# Patient Record
Sex: FEMALE | Race: BLACK OR AFRICAN AMERICAN | URBAN - METROPOLITAN AREA
[De-identification: names, ages, dates, MRNs, and addresses within clinical notes are randomized per-mention and may not be internally consistent; named-entity substitution may affect disease eponyms.]

---

## 2022-03-18 PROBLEM — F98.8 ADD (ATTENTION DEFICIT DISORDER): Status: ACTIVE | Noted: 2019-03-07

## 2023-07-07 ENCOUNTER — OFFICE VISIT (OUTPATIENT)
Facility: CLINIC | Age: 38
End: 2023-07-07

## 2023-07-07 VITALS
RESPIRATION RATE: 14 BRPM | WEIGHT: 184.4 LBS | OXYGEN SATURATION: 98 % | HEIGHT: 65 IN | DIASTOLIC BLOOD PRESSURE: 67 MMHG | BODY MASS INDEX: 30.72 KG/M2 | HEART RATE: 93 BPM | TEMPERATURE: 98.4 F | SYSTOLIC BLOOD PRESSURE: 104 MMHG

## 2023-07-07 DIAGNOSIS — Z13.1 SCREENING FOR DIABETES MELLITUS (DM): ICD-10-CM

## 2023-07-07 DIAGNOSIS — K92.1 BLOOD CLOTS IN STOOL: ICD-10-CM

## 2023-07-07 DIAGNOSIS — F90.9 ATTENTION DEFICIT HYPERACTIVITY DISORDER (ADHD), UNSPECIFIED ADHD TYPE: ICD-10-CM

## 2023-07-07 DIAGNOSIS — G43.711 INTRACTABLE CHRONIC MIGRAINE WITHOUT AURA AND WITH STATUS MIGRAINOSUS: ICD-10-CM

## 2023-07-07 DIAGNOSIS — R04.0 FREQUENT EPISTAXIS: ICD-10-CM

## 2023-07-07 DIAGNOSIS — R79.89 POSITIVE D-DIMER: ICD-10-CM

## 2023-07-07 DIAGNOSIS — M94.0 COSTOCHONDRITIS: Primary | ICD-10-CM

## 2023-07-07 DIAGNOSIS — R55 SYNCOPE, UNSPECIFIED SYNCOPE TYPE: ICD-10-CM

## 2023-07-07 DIAGNOSIS — N83.209 CYST OF OVARY, UNSPECIFIED LATERALITY: ICD-10-CM

## 2023-07-07 DIAGNOSIS — Z13.89 SCREENING FOR HEMATURIA OR PROTEINURIA: ICD-10-CM

## 2023-07-07 DIAGNOSIS — R19.4 CHANGE IN BOWEL HABITS: ICD-10-CM

## 2023-07-07 DIAGNOSIS — E89.0 POSTPROCEDURAL HYPOTHYROIDISM: ICD-10-CM

## 2023-07-07 DIAGNOSIS — Z11.4 SCREENING FOR HUMAN IMMUNODEFICIENCY VIRUS: ICD-10-CM

## 2023-07-07 DIAGNOSIS — Z87.19 HISTORY OF CELIAC DISEASE: ICD-10-CM

## 2023-07-07 DIAGNOSIS — Z13.31 POSITIVE DEPRESSION SCREENING: ICD-10-CM

## 2023-07-07 DIAGNOSIS — Z11.59 NEED FOR HEPATITIS C SCREENING TEST: ICD-10-CM

## 2023-07-07 DIAGNOSIS — M79.605 PAIN IN BOTH LOWER EXTREMITIES: ICD-10-CM

## 2023-07-07 DIAGNOSIS — Z82.49 FAMILY HISTORY OF DVT: ICD-10-CM

## 2023-07-07 DIAGNOSIS — M79.604 PAIN IN BOTH LOWER EXTREMITIES: ICD-10-CM

## 2023-07-07 PROBLEM — E04.2 NONTOXIC MULTINODULAR GOITER: Status: ACTIVE | Noted: 2023-07-07

## 2023-07-07 PROBLEM — J30.9 ALLERGIC RHINITIS: Status: ACTIVE | Noted: 2023-07-07

## 2023-07-07 PROBLEM — F32.A DEPRESSION: Status: ACTIVE | Noted: 2023-07-07

## 2023-07-07 PROBLEM — K90.0 CELIAC DISEASE: Status: ACTIVE | Noted: 2019-03-07

## 2023-07-07 PROBLEM — M25.80 SESAMOIDITIS: Status: ACTIVE | Noted: 2023-07-07

## 2023-07-07 PROBLEM — F41.9 ANXIETY: Status: ACTIVE | Noted: 2023-07-07

## 2023-07-07 PROBLEM — K90.0 CELIAC DISEASE: Status: RESOLVED | Noted: 2019-03-07 | Resolved: 2023-07-07

## 2023-07-07 PROCEDURE — 93000 ELECTROCARDIOGRAM COMPLETE: CPT | Performed by: NURSE PRACTITIONER

## 2023-07-07 PROCEDURE — 99204 OFFICE O/P NEW MOD 45 MIN: CPT | Performed by: NURSE PRACTITIONER

## 2023-07-07 RX ORDER — LIOTHYRONINE SODIUM 5 UG/1
5 TABLET ORAL DAILY
COMMUNITY

## 2023-07-07 RX ORDER — LEVOTHYROXINE SODIUM 0.15 MG/1
150 TABLET ORAL DAILY
COMMUNITY

## 2023-07-07 RX ORDER — VENLAFAXINE HYDROCHLORIDE 37.5 MG/1
37.5 CAPSULE, EXTENDED RELEASE ORAL DAILY
Qty: 30 CAPSULE | Refills: 0 | Status: SHIPPED | OUTPATIENT
Start: 2023-07-07 | End: 2023-08-06

## 2023-07-07 SDOH — ECONOMIC STABILITY: HOUSING INSECURITY
IN THE LAST 12 MONTHS, WAS THERE A TIME WHEN YOU DID NOT HAVE A STEADY PLACE TO SLEEP OR SLEPT IN A SHELTER (INCLUDING NOW)?: NO

## 2023-07-07 SDOH — ECONOMIC STABILITY: FOOD INSECURITY: WITHIN THE PAST 12 MONTHS, YOU WORRIED THAT YOUR FOOD WOULD RUN OUT BEFORE YOU GOT MONEY TO BUY MORE.: NEVER TRUE

## 2023-07-07 SDOH — ECONOMIC STABILITY: TRANSPORTATION INSECURITY
IN THE PAST 12 MONTHS, HAS LACK OF TRANSPORTATION KEPT YOU FROM MEETINGS, WORK, OR FROM GETTING THINGS NEEDED FOR DAILY LIVING?: NO

## 2023-07-07 SDOH — ECONOMIC STABILITY: INCOME INSECURITY: HOW HARD IS IT FOR YOU TO PAY FOR THE VERY BASICS LIKE FOOD, HOUSING, MEDICAL CARE, AND HEATING?: SOMEWHAT HARD

## 2023-07-07 SDOH — ECONOMIC STABILITY: FOOD INSECURITY: WITHIN THE PAST 12 MONTHS, THE FOOD YOU BOUGHT JUST DIDN'T LAST AND YOU DIDN'T HAVE MONEY TO GET MORE.: NEVER TRUE

## 2023-07-07 ASSESSMENT — ENCOUNTER SYMPTOMS
CHOKING: 0
COUGH: 0
WHEEZING: 0
STRIDOR: 0
CHEST TIGHTNESS: 1

## 2023-07-07 ASSESSMENT — PATIENT HEALTH QUESTIONNAIRE - PHQ9
7. TROUBLE CONCENTRATING ON THINGS, SUCH AS READING THE NEWSPAPER OR WATCHING TELEVISION: 3
SUM OF ALL RESPONSES TO PHQ QUESTIONS 1-9: 14
3. TROUBLE FALLING OR STAYING ASLEEP: 3
SUM OF ALL RESPONSES TO PHQ9 QUESTIONS 1 & 2: 3
SUM OF ALL RESPONSES TO PHQ QUESTIONS 1-9: 14
1. LITTLE INTEREST OR PLEASURE IN DOING THINGS: 2
6. FEELING BAD ABOUT YOURSELF - OR THAT YOU ARE A FAILURE OR HAVE LET YOURSELF OR YOUR FAMILY DOWN: 0
5. POOR APPETITE OR OVEREATING: 3
8. MOVING OR SPEAKING SO SLOWLY THAT OTHER PEOPLE COULD HAVE NOTICED. OR THE OPPOSITE, BEING SO FIGETY OR RESTLESS THAT YOU HAVE BEEN MOVING AROUND A LOT MORE THAN USUAL: 0
9. THOUGHTS THAT YOU WOULD BE BETTER OFF DEAD, OR OF HURTING YOURSELF: 0
SUM OF ALL RESPONSES TO PHQ QUESTIONS 1-9: 14
10. IF YOU CHECKED OFF ANY PROBLEMS, HOW DIFFICULT HAVE THESE PROBLEMS MADE IT FOR YOU TO DO YOUR WORK, TAKE CARE OF THINGS AT HOME, OR GET ALONG WITH OTHER PEOPLE: 2
4. FEELING TIRED OR HAVING LITTLE ENERGY: 2
2. FEELING DOWN, DEPRESSED OR HOPELESS: 1
SUM OF ALL RESPONSES TO PHQ QUESTIONS 1-9: 14

## 2023-07-10 LAB
ALBUMIN SERPL-MCNC: 3.8 G/DL (ref 3.8–4.8)
ALBUMIN/GLOB SERPL: 1.2 {RATIO} (ref 1.2–2.2)
ALP SERPL-CCNC: 55 IU/L (ref 44–121)
ALT SERPL-CCNC: 11 IU/L (ref 0–32)
APPEARANCE UR: CLEAR
AST SERPL-CCNC: 14 IU/L (ref 0–40)
BACTERIA #/AREA URNS HPF: ABNORMAL /[HPF]
BASOPHILS # BLD AUTO: 0 X10E3/UL (ref 0–0.2)
BASOPHILS NFR BLD AUTO: 0 %
BILIRUB SERPL-MCNC: 0.3 MG/DL (ref 0–1.2)
BILIRUB UR QL STRIP: NEGATIVE
BUN SERPL-MCNC: 11 MG/DL (ref 6–20)
BUN/CREAT SERPL: 17 (ref 9–23)
CALCIUM SERPL-MCNC: 9.4 MG/DL (ref 8.7–10.2)
CASTS URNS QL MICRO: ABNORMAL /LPF
CHLORIDE SERPL-SCNC: 102 MMOL/L (ref 96–106)
CO2 SERPL-SCNC: 21 MMOL/L (ref 20–29)
COLOR UR: YELLOW
CREAT SERPL-MCNC: 0.65 MG/DL (ref 0.57–1)
EGFRCR SERPLBLD CKD-EPI 2021: 116 ML/MIN/1.73
EOSINOPHIL # BLD AUTO: 0.2 X10E3/UL (ref 0–0.4)
EOSINOPHIL NFR BLD AUTO: 2 %
EPI CELLS #/AREA URNS HPF: ABNORMAL /HPF (ref 0–10)
ERYTHROCYTE [DISTWIDTH] IN BLOOD BY AUTOMATED COUNT: 12.7 % (ref 11.7–15.4)
GLOBULIN SER CALC-MCNC: 3.1 G/DL (ref 1.5–4.5)
GLUCOSE SERPL-MCNC: 82 MG/DL (ref 70–99)
GLUCOSE UR QL STRIP: NEGATIVE
HBA1C MFR BLD: 5.3 % (ref 4.8–5.6)
HCT VFR BLD AUTO: 37.1 % (ref 34–46.6)
HGB BLD-MCNC: 12 G/DL (ref 11.1–15.9)
HGB UR QL STRIP: ABNORMAL
HIV 1+2 AB+HIV1 P24 AG SERPL QL IA: NON REACTIVE
IMM GRANULOCYTES # BLD AUTO: 0 X10E3/UL (ref 0–0.1)
IMM GRANULOCYTES NFR BLD AUTO: 0 %
KETONES UR QL STRIP: NEGATIVE
LEUKOCYTE ESTERASE UR QL STRIP: NEGATIVE
LYMPHOCYTES # BLD AUTO: 2.2 X10E3/UL (ref 0.7–3.1)
LYMPHOCYTES NFR BLD AUTO: 30 %
MCH RBC QN AUTO: 27.3 PG (ref 26.6–33)
MCHC RBC AUTO-ENTMCNC: 32.3 G/DL (ref 31.5–35.7)
MCV RBC AUTO: 85 FL (ref 79–97)
MICRO URNS: ABNORMAL
MONOCYTES # BLD AUTO: 0.5 X10E3/UL (ref 0.1–0.9)
MONOCYTES NFR BLD AUTO: 7 %
NEUTROPHILS # BLD AUTO: 4.3 X10E3/UL (ref 1.4–7)
NEUTROPHILS NFR BLD AUTO: 61 %
NITRITE UR QL STRIP: NEGATIVE
PH UR STRIP: 5.5 [PH] (ref 5–7.5)
PLATELET # BLD AUTO: 358 X10E3/UL (ref 150–450)
POTASSIUM SERPL-SCNC: 4.2 MMOL/L (ref 3.5–5.2)
PROT SERPL-MCNC: 6.9 G/DL (ref 6–8.5)
PROT UR QL STRIP: ABNORMAL
RBC # BLD AUTO: 4.39 X10E6/UL (ref 3.77–5.28)
RBC #/AREA URNS HPF: ABNORMAL /HPF (ref 0–2)
SODIUM SERPL-SCNC: 138 MMOL/L (ref 134–144)
SP GR UR STRIP: 1.01 (ref 1–1.03)
SPECIMEN STATUS REPORT: NORMAL
SPECIMEN STATUS REPORT: NORMAL
UROBILINOGEN UR STRIP-MCNC: 0.2 MG/DL (ref 0.2–1)
WBC # BLD AUTO: 7.1 X10E3/UL (ref 3.4–10.8)
WBC #/AREA URNS HPF: ABNORMAL /HPF (ref 0–5)

## 2023-07-11 LAB
HCV AB SERPL QL IA: NORMAL
HCV IGG SERPL QL IA: NON REACTIVE

## 2023-07-17 ENCOUNTER — HOSPITAL ENCOUNTER (OUTPATIENT)
Facility: HOSPITAL | Age: 38
Discharge: HOME | End: 2023-07-20
Payer: OTHER GOVERNMENT

## 2023-07-17 ENCOUNTER — HOSPITAL ENCOUNTER (OUTPATIENT)
Facility: HOSPITAL | Age: 38
Discharge: HOME | End: 2023-07-19
Payer: OTHER GOVERNMENT

## 2023-07-17 DIAGNOSIS — M79.605 PAIN IN BOTH LOWER EXTREMITIES: ICD-10-CM

## 2023-07-17 DIAGNOSIS — M79.604 PAIN IN BOTH LOWER EXTREMITIES: ICD-10-CM

## 2023-07-17 DIAGNOSIS — R79.89 POSITIVE D-DIMER: ICD-10-CM

## 2023-07-17 DIAGNOSIS — G43.711 INTRACTABLE CHRONIC MIGRAINE WITHOUT AURA AND WITH STATUS MIGRAINOSUS: ICD-10-CM

## 2023-07-17 DIAGNOSIS — R04.0 FREQUENT EPISTAXIS: ICD-10-CM

## 2023-07-17 PROCEDURE — 93970 EXTREMITY STUDY: CPT

## 2023-07-17 PROCEDURE — 6360000004 HC RX CONTRAST MEDICATION: Performed by: NURSE PRACTITIONER

## 2023-07-17 PROCEDURE — 70470 CT HEAD/BRAIN W/O & W/DYE: CPT

## 2023-07-17 PROCEDURE — 93970 EXTREMITY STUDY: CPT | Performed by: INTERNAL MEDICINE

## 2023-07-17 RX ADMIN — IOPAMIDOL 100 ML: 612 INJECTION, SOLUTION INTRAVENOUS at 12:50

## 2023-07-20 ENCOUNTER — OFFICE VISIT (OUTPATIENT)
Facility: CLINIC | Age: 38
End: 2023-07-20
Payer: OTHER GOVERNMENT

## 2023-07-20 ENCOUNTER — HOSPITAL ENCOUNTER (OUTPATIENT)
Facility: HOSPITAL | Age: 38
Setting detail: SPECIMEN
Discharge: HOME | End: 2023-07-20
Payer: OTHER GOVERNMENT

## 2023-07-20 VITALS
HEART RATE: 106 BPM | DIASTOLIC BLOOD PRESSURE: 67 MMHG | SYSTOLIC BLOOD PRESSURE: 104 MMHG | OXYGEN SATURATION: 95 % | WEIGHT: 187 LBS | BODY MASS INDEX: 31.16 KG/M2 | RESPIRATION RATE: 14 BRPM | TEMPERATURE: 98.9 F | HEIGHT: 65 IN

## 2023-07-20 DIAGNOSIS — F52.6 SEXUAL PAIN DISORDER: Primary | ICD-10-CM

## 2023-07-20 DIAGNOSIS — Z01.419 ENCOUNTER FOR CERVICAL PAP SMEAR WITH PELVIC EXAM: ICD-10-CM

## 2023-07-20 DIAGNOSIS — Z11.3 SCREENING FOR STD (SEXUALLY TRANSMITTED DISEASE): ICD-10-CM

## 2023-07-20 DIAGNOSIS — R31.1 BENIGN ESSENTIAL MICROSCOPIC HEMATURIA: ICD-10-CM

## 2023-07-20 PROCEDURE — 87491 CHLMYD TRACH DNA AMP PROBE: CPT

## 2023-07-20 PROCEDURE — 87798 DETECT AGENT NOS DNA AMP: CPT

## 2023-07-20 PROCEDURE — 99214 OFFICE O/P EST MOD 30 MIN: CPT | Performed by: NURSE PRACTITIONER

## 2023-07-20 PROCEDURE — 87624 HPV HI-RISK TYP POOLED RSLT: CPT

## 2023-07-20 PROCEDURE — 87801 DETECT AGNT MULT DNA AMPLI: CPT

## 2023-07-20 PROCEDURE — 87661 TRICHOMONAS VAGINALIS AMPLIF: CPT

## 2023-07-20 PROCEDURE — 87591 N.GONORRHOEAE DNA AMP PROB: CPT

## 2023-07-20 PROCEDURE — 88175 CYTOPATH C/V AUTO FLUID REDO: CPT

## 2023-07-20 ASSESSMENT — PATIENT HEALTH QUESTIONNAIRE - PHQ9
SUM OF ALL RESPONSES TO PHQ9 QUESTIONS 1 & 2: 0
2. FEELING DOWN, DEPRESSED OR HOPELESS: 0
SUM OF ALL RESPONSES TO PHQ QUESTIONS 1-9: 0
1. LITTLE INTEREST OR PLEASURE IN DOING THINGS: 0
SUM OF ALL RESPONSES TO PHQ QUESTIONS 1-9: 0

## 2023-07-20 NOTE — PROGRESS NOTES
Madisyn Kilpatrick presents today for   Chief Complaint   Patient presents with    Gynecologic Exam       Is someone accompanying this pt? no    Is the patient using any DME equipment during OV? no    Depression Screening:  No flowsheet data found. Learning Assessment:  No flowsheet data found. Health Maintenance reviewed and discussed and ordered per Provider. Health Maintenance Due   Topic Date Due    COVID-19 Vaccine (3 - Booster for Pfizer series) 12/01/2021    Cervical cancer screen  05/08/2022   . Coordination of Care:  1. Have you been to the ER, urgent care clinic since your last visit? Hospitalized since your last visit? no    2. Have you seen or consulted any other health care providers outside of the [address] [place] [address] since your last visit? Include any pap smears or colon screening. Yes endocrinologist     3. For patients aged 43-73: Has the patient had a colonoscopy / FIT/ Cologuard? N/A      If the patient is female:    4. For patients aged 43-66: Has the patient had a mammogram within the past 2 years? N/a      5. For patients aged 21-65: Has the patient had a pap smear?  Patient will have this done in office

## 2023-07-20 NOTE — PROGRESS NOTES
Akbar Valenzuela is a 45 y.o. female and presents with Gynecologic Exam       Assessment/Plan:    1. Sexual pain disorder  2. Encounter for cervical Pap smear with pelvic exam  -     PAP IG, Aptima HPV and rfx 16/18,45 ([id number]); Future  3. Benign essential microscopic hematuria  4. Screening for STD (sexually transmitted disease)  -     Bacterial Vaginosis Panel; Future  -     Candida Vaginitis and Trichomonas Vaginalis Amplification; Future  -     C.trachomatis N.gonorrhoeae DNA; Future         Follow up and disposition:   Return in about 4 weeks (around 8/17/2023). Subjective:    Labs obtained prior to visit? Yes  Reviewed with patient? yes    [facility] 6-  PAP 2020  1 abnormal PAP in the past  No family history of breast cancer   No family history of GYN cancer    1 sexual partner in the last 12 months   No  concerns  Pain with sex   No breast concerns     ROS:     Review of Systems   Respiratory:  Negative for cough, choking, chest tightness, wheezing and stridor. Cardiovascular:  Negative for palpitations and leg swelling. Gastrointestinal: Negative. Genitourinary:  Positive for vaginal pain. Negative for decreased urine volume, dysuria, pelvic pain, vaginal bleeding and vaginal discharge. Neurological:  Positive for headaches. Psychiatric/Behavioral:  Negative for dysphoric mood, sleep disturbance and suicidal ideas. The patient is nervous/anxious. The problem list was updated as a part of today's visit.   Patient Active Problem List   Diagnosis    Thyroid disease    Attention deficit hyperactivity disorder (ADHD)    Injury of back    Costochondritis    Sesamoiditis    Allergic rhinitis    Anxiety    Depression    Nontoxic multinodular goiter    Postprocedural hypothyroidism    Pain in both lower extremities    Positive depression screening    Positive D-dimer    Intractable chronic migraine without aura and with status migrainosus    Frequent epistaxis    Syncope

## 2023-07-21 ENCOUNTER — TELEPHONE (OUTPATIENT)
Facility: CLINIC | Age: 38
End: 2023-07-21

## 2023-07-21 DIAGNOSIS — B96.89 BACTERIAL VAGINOSIS: Primary | ICD-10-CM

## 2023-07-21 DIAGNOSIS — N76.0 BACTERIAL VAGINOSIS: Primary | ICD-10-CM

## 2023-07-21 LAB
BV DNA PNL VAG NAA+PROBE: POSITIVE
C GLABRATA DNA VAG QL NAA+PROBE: NEGATIVE
C TRACH RRNA SPEC QL NAA+PROBE: NEGATIVE
CANDIDA DNA VAG QL NAA+PROBE: NEGATIVE
N GONORRHOEA RRNA SPEC QL NAA+PROBE: NEGATIVE
SPECIMEN SOURCE: NORMAL
T VAGINALIS RRNA SPEC QL NAA+PROBE: NEGATIVE

## 2023-07-21 RX ORDER — METRONIDAZOLE 7.5 MG/G
1 GEL VAGINAL DAILY
Qty: 70 G | Refills: 0 | Status: SHIPPED | OUTPATIENT
Start: 2023-07-21 | End: 2023-07-26

## 2023-07-21 NOTE — TELEPHONE ENCOUNTER
Notified patient of positive bacterial vaginosis results. Will call in Metrogel. Notified patient that chlamydia and gonorrhea labs were negative and PAP is still in process.

## 2023-07-25 ASSESSMENT — ENCOUNTER SYMPTOMS
GASTROINTESTINAL NEGATIVE: 1
CHOKING: 0
CHEST TIGHTNESS: 0
WHEEZING: 0
STRIDOR: 0
COUGH: 0

## 2023-08-15 DIAGNOSIS — Z13.31 POSITIVE DEPRESSION SCREENING: ICD-10-CM

## 2023-08-21 DIAGNOSIS — Z13.31 POSITIVE DEPRESSION SCREENING: ICD-10-CM

## 2023-08-22 RX ORDER — VENLAFAXINE HYDROCHLORIDE 37.5 MG/1
37.5 CAPSULE, EXTENDED RELEASE ORAL DAILY
Qty: 30 CAPSULE | Refills: 0 | Status: SHIPPED | OUTPATIENT
Start: 2023-08-22 | End: 2023-09-21

## 2023-08-22 RX ORDER — VENLAFAXINE HYDROCHLORIDE 37.5 MG/1
37.5 CAPSULE, EXTENDED RELEASE ORAL DAILY
Qty: 30 CAPSULE | Refills: 0 | OUTPATIENT
Start: 2023-08-22 | End: 2023-09-21

## 2023-09-18 ENCOUNTER — OFFICE VISIT (OUTPATIENT)
Facility: CLINIC | Age: 38
End: 2023-09-18

## 2023-09-18 ENCOUNTER — HOSPITAL ENCOUNTER (OUTPATIENT)
Facility: HOSPITAL | Age: 38
Setting detail: SPECIMEN
Discharge: HOME | End: 2023-09-21
Payer: OTHER GOVERNMENT

## 2023-09-18 VITALS
RESPIRATION RATE: 14 BRPM | WEIGHT: 180.4 LBS | OXYGEN SATURATION: 98 % | HEIGHT: 65 IN | TEMPERATURE: 98.4 F | BODY MASS INDEX: 30.06 KG/M2 | DIASTOLIC BLOOD PRESSURE: 79 MMHG | SYSTOLIC BLOOD PRESSURE: 115 MMHG | HEART RATE: 88 BPM

## 2023-09-18 DIAGNOSIS — E89.0 POSTPROCEDURAL HYPOTHYROIDISM: ICD-10-CM

## 2023-09-18 DIAGNOSIS — J35.8 TONSIL STONE: ICD-10-CM

## 2023-09-18 DIAGNOSIS — K92.1 BLOOD CLOTS IN STOOL: ICD-10-CM

## 2023-09-18 DIAGNOSIS — F32.89 OTHER DEPRESSION: ICD-10-CM

## 2023-09-18 DIAGNOSIS — R19.6 BAD BREATH: ICD-10-CM

## 2023-09-18 DIAGNOSIS — Z13.31 POSITIVE DEPRESSION SCREENING: ICD-10-CM

## 2023-09-18 DIAGNOSIS — Z23 ENCOUNTER FOR IMMUNIZATION: Primary | ICD-10-CM

## 2023-09-18 LAB
ERYTHROCYTE [DISTWIDTH] IN BLOOD BY AUTOMATED COUNT: 13.1 % (ref 11.6–14.5)
HCT VFR BLD AUTO: 38.6 % (ref 35–45)
HGB BLD-MCNC: 12.1 G/DL (ref 12–16)
MCH RBC QN AUTO: 26.9 PG (ref 24–34)
MCHC RBC AUTO-ENTMCNC: 31.3 G/DL (ref 31–37)
MCV RBC AUTO: 86 FL (ref 78–100)
NRBC # BLD: 0 K/UL (ref 0–0.01)
NRBC BLD-RTO: 0 PER 100 WBC
PLATELET # BLD AUTO: 309 K/UL (ref 135–420)
PMV BLD AUTO: 10.7 FL (ref 9.2–11.8)
RBC # BLD AUTO: 4.49 M/UL (ref 4.2–5.3)
TSH SERPL DL<=0.05 MIU/L-ACNC: 0.42 UIU/ML (ref 0.36–3.74)
WBC # BLD AUTO: 5 K/UL (ref 4.6–13.2)

## 2023-09-18 PROCEDURE — 84443 ASSAY THYROID STIM HORMONE: CPT

## 2023-09-18 PROCEDURE — 85027 COMPLETE CBC AUTOMATED: CPT

## 2023-09-18 PROCEDURE — 36415 COLL VENOUS BLD VENIPUNCTURE: CPT

## 2023-09-18 RX ORDER — LEVOTHYROXINE SODIUM 0.15 MG/1
150 TABLET ORAL DAILY
Qty: 30 TABLET | Refills: 0 | Status: SHIPPED | OUTPATIENT
Start: 2023-09-18

## 2023-09-18 RX ORDER — VENLAFAXINE HYDROCHLORIDE 37.5 MG/1
37.5 CAPSULE, EXTENDED RELEASE ORAL DAILY
Qty: 90 CAPSULE | Refills: 0 | Status: SHIPPED | OUTPATIENT
Start: 2023-09-18

## 2023-09-18 RX ORDER — LIOTHYRONINE SODIUM 5 UG/1
5 TABLET ORAL DAILY
Qty: 30 TABLET | Refills: 0 | Status: SHIPPED | OUTPATIENT
Start: 2023-09-18

## 2023-09-18 ASSESSMENT — PATIENT HEALTH QUESTIONNAIRE - PHQ9
SUM OF ALL RESPONSES TO PHQ QUESTIONS 1-9: 1
1. LITTLE INTEREST OR PLEASURE IN DOING THINGS: 1
2. FEELING DOWN, DEPRESSED OR HOPELESS: 0
SUM OF ALL RESPONSES TO PHQ QUESTIONS 1-9: 1
SUM OF ALL RESPONSES TO PHQ9 QUESTIONS 1 & 2: 1

## 2023-09-18 NOTE — PROGRESS NOTES
Mabel Sanchez is a 45 y.o. female and presents with Follow-up, Medication Refill, and Oral Swelling (Right side, has a weird taste )       Assessment/Plan:    1. Encounter for immunization  -     Influenza, FLUCELVAX, (age 10 mo+), IM, Preservative Free, 0.5 mL  2. Positive depression screening  -     venlafaxine (EFFEXOR XR) 37.5 MG extended release capsule; Take 1 capsule by mouth daily, Disp-90 capsule, R-0Normal  3. Postprocedural hypothyroidism  Comments:  seeing Dr. Katie Castillo; next appt 11/2023  Orders:  -     levothyroxine (SYNTHROID) 150 MCG tablet; Take 1 tablet by mouth Daily, Disp-30 tablet, R-0Normal  -     liothyronine (CYTOMEL) 5 MCG tablet; Take 1 tablet by mouth daily, Disp-30 tablet, R-0Normal  -     TSH; Future  4. Other depression  Comments:  seeing therapist; controlled on Effexor  5. Blood clots in stool  Comments:  previous GI referral did not go through    3 episodes of bloody stool  Orders:  -     External Referral To Gastroenterology  -     Occult Blood Stool Immunoassay; Future  -     CBC; Future  6. Bad breath  7. Tonsil stone         Follow up and disposition:   Return in about 4 weeks (around 10/16/2023) for f/u for GI referral .      Subjective:    Labs obtained prior to visit? No  Reviewed with patient? N/A        ROS:     Review of Systems   HENT:  Negative for trouble swallowing and voice change. Bad breath/tonsil stones   Respiratory:  Negative for cough, choking, chest tightness, wheezing and stridor. Cardiovascular:  Negative for palpitations and leg swelling. Gastrointestinal:  Positive for blood in stool. Genitourinary:  Negative for decreased urine volume, dysuria, pelvic pain, vaginal bleeding, vaginal discharge and vaginal pain. Neurological:  Negative for headaches. Psychiatric/Behavioral:  Negative for dysphoric mood, sleep disturbance and suicidal ideas. The patient is nervous/anxious.           The problem list was updated as a part

## 2023-09-18 NOTE — PROGRESS NOTES
Patient states her last menstrual cycle was September 15. Andrea Clifford presents today for   Chief Complaint   Patient presents with    Follow-up    Medication Refill    Oral Swelling     Right side, has a weird taste        Is someone accompanying this pt? no    Is the patient using any DME equipment during OV? no    Depression Screening:       No data to display                Learning Assessment:  No flowsheet data found. Health Maintenance reviewed and discussed and ordered per Provider. Health Maintenance Due   Topic Date Due    Hepatitis B vaccine (1 of 3 - 3-dose series) Never done    COVID-19 Vaccine (3 - Pfizer series) 12/01/2021    Cervical cancer screen  07/21/2023    Flu vaccine (1) 08/01/2023   . Coordination of Care:  1. Have you been to the ER, urgent care clinic since your last visit? Hospitalized since your last visit? no    2. Have you seen or consulted any other health care providers outside of the [address] [place] [address] since your last visit? Include any pap smears or colon screening. no    3. For patients aged 43-73: Has the patient had a colonoscopy / FIT/ Cologuard? N/a      If the patient is female:    4. For patients aged 43-66: Has the patient had a mammogram within the past 2 years? N/a      5. For patients aged 21-65: Has the patient had a pap smear? Yes      Patient was given VIS for review, consent was obtained and per orders of KARLEE. Oriana Tan , injection of Flu vaccine given by [facility]. Patient observed. No signs nor symptoms of any adverse reactions. Patient tolerated injection well.

## 2023-09-23 ASSESSMENT — ENCOUNTER SYMPTOMS
TROUBLE SWALLOWING: 0
CHEST TIGHTNESS: 0
CHOKING: 0
BLOOD IN STOOL: 1
STRIDOR: 0
COUGH: 0
WHEEZING: 0
VOICE CHANGE: 0

## 2023-10-27 DIAGNOSIS — R93.89 ENDOMETRIAL THICKENING ON ULTRASOUND: Primary | ICD-10-CM

## 2023-10-27 DIAGNOSIS — D25.9 UTERINE LEIOMYOMA, UNSPECIFIED LOCATION: ICD-10-CM

## 2023-10-27 PROBLEM — R10.9 ABDOMINAL PAIN: Status: ACTIVE | Noted: 2023-09-25

## 2023-10-27 PROBLEM — N39.0 URINARY TRACT INFECTION: Status: ACTIVE | Noted: 2023-09-25

## 2023-10-27 NOTE — PROGRESS NOTES
Left voicemail for patient that this provider has ordered MRI of pelvis due to recent ER visit with ultrasound showing thickened endometrium. Tyntt message sent as well.

## 2023-10-31 DIAGNOSIS — N85.2 ENLARGED UTERUS: ICD-10-CM

## 2023-10-31 DIAGNOSIS — R93.89 THICKENED ENDOMETRIUM: Primary | ICD-10-CM

## 2023-10-31 NOTE — PROGRESS NOTES
Patient seen at [facility] ER for lower abdominal pain and had CT/ultrasound pelvis showing thickened endometrium and enlarged uterus. MRI pelvis ordered and urgent OB/GYN referral placed.

## 2023-11-14 ENCOUNTER — HOSPITAL ENCOUNTER (OUTPATIENT)
Facility: HOSPITAL | Age: 38
Discharge: HOME | End: 2023-11-17
Payer: OTHER GOVERNMENT

## 2023-11-14 DIAGNOSIS — R93.89 ENDOMETRIAL THICKENING ON ULTRASOUND: ICD-10-CM

## 2023-11-14 DIAGNOSIS — D25.9 UTERINE LEIOMYOMA, UNSPECIFIED LOCATION: ICD-10-CM

## 2023-11-14 PROCEDURE — 72197 MRI PELVIS W/O & W/DYE: CPT

## 2023-11-14 PROCEDURE — 6360000004 HC RX CONTRAST MEDICATION: Performed by: NURSE PRACTITIONER

## 2023-11-14 PROCEDURE — A9577 INJ MULTIHANCE: HCPCS | Performed by: NURSE PRACTITIONER

## 2023-11-14 RX ADMIN — GADOBENATE DIMEGLUMINE 18 ML: 529 INJECTION, SOLUTION INTRAVENOUS at 14:44

## 2023-11-17 ENCOUNTER — TELEPHONE (OUTPATIENT)
Facility: CLINIC | Age: 38
End: 2023-11-17

## 2023-11-17 NOTE — TELEPHONE ENCOUNTER
Reviewed MRI results showing fibroid and lesion which represents fibroid with red degeneration versus deep infiltrating endometriosis and rectosigmoid tethering. Patient advised to go to ER now due to possible deep infiltrating endometrosis but pt states is driving to [place] because pt's friend father is passing away. Pt states will go to ER if abdominal pain worsens and will call OB/GYN today to schedule an appointment. Patient states has information for Dr. Ty Angelo (OB/GYN) but has not scheduled an appointment.

## 2023-11-26 PROBLEM — N39.0 URINARY TRACT INFECTION: Status: RESOLVED | Noted: 2023-09-25 | Resolved: 2023-11-26

## 2024-04-04 DIAGNOSIS — Z13.31 POSITIVE DEPRESSION SCREENING: ICD-10-CM

## 2024-04-19 RX ORDER — VENLAFAXINE HYDROCHLORIDE 37.5 MG/1
37.5 CAPSULE, EXTENDED RELEASE ORAL DAILY
Qty: 90 CAPSULE | Refills: 0 | Status: SHIPPED | OUTPATIENT
Start: 2024-04-19

## 2025-01-06 ENCOUNTER — OFFICE VISIT (OUTPATIENT)
Facility: CLINIC | Age: 40
End: 2025-01-06

## 2025-01-06 VITALS
WEIGHT: 188.8 LBS | HEIGHT: 66 IN | DIASTOLIC BLOOD PRESSURE: 78 MMHG | HEART RATE: 97 BPM | BODY MASS INDEX: 30.34 KG/M2 | SYSTOLIC BLOOD PRESSURE: 115 MMHG | RESPIRATION RATE: 22 BRPM | OXYGEN SATURATION: 96 % | TEMPERATURE: 98.6 F

## 2025-01-06 DIAGNOSIS — J30.1 SEASONAL ALLERGIC RHINITIS DUE TO POLLEN: ICD-10-CM

## 2025-01-06 DIAGNOSIS — E04.2 NONTOXIC MULTINODULAR GOITER: ICD-10-CM

## 2025-01-06 DIAGNOSIS — Z12.39 ENCOUNTER FOR BREAST CANCER SCREENING OTHER THAN MAMMOGRAM: ICD-10-CM

## 2025-01-06 DIAGNOSIS — D50.0 IRON DEFICIENCY ANEMIA DUE TO CHRONIC BLOOD LOSS: ICD-10-CM

## 2025-01-06 DIAGNOSIS — F41.9 ANXIETY: ICD-10-CM

## 2025-01-06 DIAGNOSIS — R93.89 THICKENED ENDOMETRIUM: ICD-10-CM

## 2025-01-06 DIAGNOSIS — D25.9 UTERINE LEIOMYOMA, UNSPECIFIED LOCATION: ICD-10-CM

## 2025-01-06 DIAGNOSIS — Z00.00 ANNUAL PHYSICAL EXAM: Primary | ICD-10-CM

## 2025-01-06 DIAGNOSIS — Z23 ENCOUNTER FOR IMMUNIZATION: ICD-10-CM

## 2025-01-06 DIAGNOSIS — E89.0 POSTPROCEDURAL HYPOTHYROIDISM: ICD-10-CM

## 2025-01-06 PROBLEM — R19.6 BAD BREATH: Status: RESOLVED | Noted: 2023-09-18 | Resolved: 2025-01-06

## 2025-01-06 PROBLEM — R31.1 BENIGN ESSENTIAL MICROSCOPIC HEMATURIA: Status: RESOLVED | Noted: 2023-07-20 | Resolved: 2025-01-06

## 2025-01-06 PROBLEM — Z01.419 ENCOUNTER FOR CERVICAL PAP SMEAR WITH PELVIC EXAM: Status: RESOLVED | Noted: 2025-01-06 | Resolved: 2025-01-06

## 2025-01-06 PROBLEM — R55 SYNCOPE: Status: RESOLVED | Noted: 2023-07-07 | Resolved: 2025-01-06

## 2025-01-06 PROBLEM — R79.89 POSITIVE D-DIMER: Status: RESOLVED | Noted: 2023-07-07 | Resolved: 2025-01-06

## 2025-01-06 PROBLEM — R10.9 ABDOMINAL PAIN: Status: RESOLVED | Noted: 2023-09-25 | Resolved: 2025-01-06

## 2025-01-06 PROBLEM — N83.209 CYST OF OVARY: Status: RESOLVED | Noted: 2023-07-07 | Resolved: 2025-01-06

## 2025-01-06 PROBLEM — Z01.419 ENCOUNTER FOR CERVICAL PAP SMEAR WITH PELVIC EXAM: Status: ACTIVE | Noted: 2025-01-06

## 2025-01-06 PROBLEM — J35.8 TONSIL STONE: Status: RESOLVED | Noted: 2023-09-18 | Resolved: 2025-01-06

## 2025-01-06 PROBLEM — Z82.49 FAMILY HISTORY OF DVT: Status: RESOLVED | Noted: 2023-07-07 | Resolved: 2025-01-06

## 2025-01-06 PROBLEM — Z13.31 POSITIVE DEPRESSION SCREENING: Status: RESOLVED | Noted: 2023-07-07 | Resolved: 2025-01-06

## 2025-01-06 PROBLEM — Z87.19 HISTORY OF CELIAC DISEASE: Status: RESOLVED | Noted: 2023-07-07 | Resolved: 2025-01-06

## 2025-01-06 PROBLEM — S39.92XA INJURY OF BACK: Status: RESOLVED | Noted: 2019-06-07 | Resolved: 2025-01-06

## 2025-01-06 PROBLEM — M94.0 COSTOCHONDRITIS: Status: RESOLVED | Noted: 2023-07-07 | Resolved: 2025-01-06

## 2025-01-06 PROBLEM — G43.711 INTRACTABLE CHRONIC MIGRAINE WITHOUT AURA AND WITH STATUS MIGRAINOSUS: Status: RESOLVED | Noted: 2023-07-07 | Resolved: 2025-01-06

## 2025-01-06 PROBLEM — M25.80 SESAMOIDITIS: Status: RESOLVED | Noted: 2023-07-07 | Resolved: 2025-01-06

## 2025-01-06 PROBLEM — F52.6 SEXUAL PAIN DISORDER: Status: RESOLVED | Noted: 2023-07-20 | Resolved: 2025-01-06

## 2025-01-06 PROBLEM — R19.4 CHANGE IN BOWEL HABITS: Status: RESOLVED | Noted: 2023-07-07 | Resolved: 2025-01-06

## 2025-01-06 PROBLEM — N85.2 ENLARGED UTERUS: Status: RESOLVED | Noted: 2023-10-31 | Resolved: 2025-01-06

## 2025-01-06 PROBLEM — F32.A DEPRESSION: Status: RESOLVED | Noted: 2023-07-07 | Resolved: 2025-01-06

## 2025-01-06 PROBLEM — K92.1 BLOOD CLOTS IN STOOL: Status: RESOLVED | Noted: 2023-07-07 | Resolved: 2025-01-06

## 2025-01-06 PROBLEM — R04.0 FREQUENT EPISTAXIS: Status: RESOLVED | Noted: 2023-07-07 | Resolved: 2025-01-06

## 2025-01-06 PROBLEM — F90.9 ATTENTION DEFICIT HYPERACTIVITY DISORDER (ADHD): Status: RESOLVED | Noted: 2019-03-07 | Resolved: 2025-01-06

## 2025-01-06 RX ORDER — ASPIRIN 81 MG/1
81 TABLET ORAL 3 TIMES DAILY
COMMUNITY

## 2025-01-06 RX ORDER — ERGOCALCIFEROL 1.25 MG/1
CAPSULE, LIQUID FILLED ORAL
COMMUNITY
Start: 2025-01-03

## 2025-01-06 RX ORDER — FERROUS SULFATE 325(65) MG
325 TABLET ORAL
Qty: 90 TABLET | Refills: 1 | Status: SHIPPED | OUTPATIENT
Start: 2025-01-06

## 2025-01-06 SDOH — ECONOMIC STABILITY: INCOME INSECURITY: HOW HARD IS IT FOR YOU TO PAY FOR THE VERY BASICS LIKE FOOD, HOUSING, MEDICAL CARE, AND HEATING?: NOT HARD AT ALL

## 2025-01-06 SDOH — ECONOMIC STABILITY: FOOD INSECURITY: WITHIN THE PAST 12 MONTHS, THE FOOD YOU BOUGHT JUST DIDN'T LAST AND YOU DIDN'T HAVE MONEY TO GET MORE.: NEVER TRUE

## 2025-01-06 SDOH — ECONOMIC STABILITY: FOOD INSECURITY: WITHIN THE PAST 12 MONTHS, YOU WORRIED THAT YOUR FOOD WOULD RUN OUT BEFORE YOU GOT MONEY TO BUY MORE.: NEVER TRUE

## 2025-01-06 ASSESSMENT — PATIENT HEALTH QUESTIONNAIRE - PHQ9
SUM OF ALL RESPONSES TO PHQ QUESTIONS 1-9: 0
SUM OF ALL RESPONSES TO PHQ QUESTIONS 1-9: 0
1. LITTLE INTEREST OR PLEASURE IN DOING THINGS: NOT AT ALL
SUM OF ALL RESPONSES TO PHQ QUESTIONS 1-9: 0
SUM OF ALL RESPONSES TO PHQ QUESTIONS 1-9: 0
SUM OF ALL RESPONSES TO PHQ9 QUESTIONS 1 & 2: 0
2. FEELING DOWN, DEPRESSED OR HOPELESS: NOT AT ALL

## 2025-01-06 NOTE — PROGRESS NOTES
[address] [place] [address]               [phone]      Carolynn Doe is a 39 y.o. female and presents with Annual Exam (Retaining water ) and Endometriosis (Fluids that comes out is clear for 2 in a half months when she have menstrual cycle, sometimes she has pain when she urinate after her bladder is empty. Clots that be the size of her fist that are solid. )       Assessment/Plan:    1. Annual physical exam  2. Encounter for breast cancer screening other than mammogram  3. Uterine leiomyoma, unspecified location  Assessment & Plan:    Referred to GYN  Orders:  -     External Referral To Gynecology  4. Encounter for immunization  -     Influenza, FLUCELVAX Trivalent, (age 6 mo+) IM, Preservative Free, 0.5mL  5. Iron deficiency anemia due to chronic blood loss  -     ferrous sulfate (IRON 325) 325 (65 Fe) MG tablet; Take 1 tablet by mouth daily (with breakfast), Disp-90 tablet, R-1Normal  -     ascorbic acid (V-R VITAMIN C) 250 MG tablet; Take 1 tablet by mouth daily, Disp-90 tablet, R-1Normal  6. Seasonal allergic rhinitis due to pollen  Assessment & Plan:   Chronic, at goal (stable), continue current treatment plan  7. Postprocedural hypothyroidism  Assessment & Plan:   Monitored by specialist- no acute findings meriting change in the plan  8. Nontoxic multinodular goiter  Assessment & Plan:   Monitored by specialist- no acute findings meriting change in the plan  9. Thickened endometrium  Assessment & Plan:    Referred to GYN  10. Anxiety  Assessment & Plan:   Chronic, at goal (stable), continue current treatment plan         Follow up and disposition:   Return in about 6 months (around 7/6/2025).      Subjective:    Labs obtained prior to visit? No  Reviewed with patient? N/A    RUST 12-     ROS:     Review of Systems   Constitutional:  Positive for fatigue.   HENT:  Negative for trouble swallowing and voice change.         Bad breath/tonsil stones

## 2025-01-06 NOTE — PROGRESS NOTES
Patient wants to go back to [facility]. Lat menstrual cycle was 12/17    Carolynn Doe presents today for   Chief Complaint   Patient presents with    Annual Exam     Retaining water     Endometriosis     Fluids that comes out is clear for 2 in a half months when she have menstrual cycle, sometimes she has pain when she urinate after her bladder is empty. Clots that be the size of her fist that are solid.        Is someone accompanying this pt? No     Is the patient using any DME equipment during OV? No     Depression Screening:       No data to display                Learning Assessment:  Failed to redirect to the Timeline version of the [employer].    Health Maintenance reviewed and discussed and ordered per Provider.    There are no preventive care reminders to display for this patient.  .    \"Have you been to the ER, urgent care clinic since your last visit?  Hospitalized since your last visit?\"    Yes [facility] 12/6 pelvic pain     “Have you seen or consulted any other health care providers outside our system since your last visit?”    No      “Have you had a pap smear?”    Patient already had this done last year     Date of last Cervical Cancer screen (HPV or PAP): 7/20/2023              Patient  consent was obtained and per orders of NP.Imelda Chanel , injection of Flu vaccine given by [facility]. Patient observed. No signs nor symptoms of any adverse reactions.Patient tolerated injection well.

## 2025-01-10 PROBLEM — M79.605 PAIN IN BOTH LOWER EXTREMITIES: Status: RESOLVED | Noted: 2023-07-07 | Resolved: 2025-01-10

## 2025-01-10 PROBLEM — M79.604 PAIN IN BOTH LOWER EXTREMITIES: Status: RESOLVED | Noted: 2023-07-07 | Resolved: 2025-01-10

## 2025-01-10 ASSESSMENT — ENCOUNTER SYMPTOMS
CHOKING: 0
VOICE CHANGE: 0
WHEEZING: 0
ABDOMINAL PAIN: 0
TROUBLE SWALLOWING: 0
BLOOD IN STOOL: 0
STRIDOR: 0
COUGH: 0
CHEST TIGHTNESS: 0
ANAL BLEEDING: 0

## 2025-01-16 ENCOUNTER — TELEPHONE (OUTPATIENT)
Facility: CLINIC | Age: 40
End: 2025-01-16

## 2025-01-16 DIAGNOSIS — D25.9 UTERINE LEIOMYOMA, UNSPECIFIED LOCATION: ICD-10-CM

## 2025-01-16 DIAGNOSIS — R93.89 THICKENED ENDOMETRIUM: Primary | ICD-10-CM

## 2025-01-16 PROBLEM — N93.9 EPISODE OF HEAVY VAGINAL BLEEDING: Status: ACTIVE | Noted: 2025-01-15

## 2025-01-16 NOTE — TELEPHONE ENCOUNTER
On 01/15/2025 patient presented to [facility] ER for vaginal bleeding and had D&C and hysteroscopy with Dr. Raymond Kaiser with [facility]. Urgent referral sent to Dr. Kaiser and Dr. Jean (GYN oncology).  Notified referrals coordinator of urgent request.     Patient last seen in office 09/2023 and MRI pelvis completed 11/14/2023 for fibroids and endometrial thickening--pt was referred to OB/GYN but did not go or follow-up for treatment of fibroids with GYN.    Patient then was seen again at [facility] 12/24 for vaginal bleeding and ultrasound showed again the fibroid uterus and endometrial thickening.     Pt presented for PAP smear/physical 01/06/2025 and was again referred to GYN.

## 2025-01-21 ENCOUNTER — OFFICE VISIT (OUTPATIENT)
Facility: CLINIC | Age: 40
End: 2025-01-21
Payer: OTHER GOVERNMENT

## 2025-01-21 VITALS
OXYGEN SATURATION: 96 % | BODY MASS INDEX: 28.87 KG/M2 | HEART RATE: 91 BPM | HEIGHT: 66 IN | RESPIRATION RATE: 17 BRPM | DIASTOLIC BLOOD PRESSURE: 63 MMHG | SYSTOLIC BLOOD PRESSURE: 97 MMHG | TEMPERATURE: 98.3 F | WEIGHT: 179.6 LBS

## 2025-01-21 DIAGNOSIS — N93.9 VAGINAL BLEEDING: ICD-10-CM

## 2025-01-21 DIAGNOSIS — D25.9 UTERINE LEIOMYOMA, UNSPECIFIED LOCATION: ICD-10-CM

## 2025-01-21 DIAGNOSIS — E89.0 POSTPROCEDURAL HYPOTHYROIDISM: ICD-10-CM

## 2025-01-21 DIAGNOSIS — Z09 HOSPITAL DISCHARGE FOLLOW-UP: Primary | ICD-10-CM

## 2025-01-21 DIAGNOSIS — D50.0 IRON DEFICIENCY ANEMIA DUE TO CHRONIC BLOOD LOSS: ICD-10-CM

## 2025-01-21 DIAGNOSIS — R93.89 THICKENED ENDOMETRIUM: ICD-10-CM

## 2025-01-21 PROCEDURE — 1111F DSCHRG MED/CURRENT MED MERGE: CPT | Performed by: NURSE PRACTITIONER

## 2025-01-21 PROCEDURE — 99214 OFFICE O/P EST MOD 30 MIN: CPT | Performed by: NURSE PRACTITIONER

## 2025-01-21 RX ORDER — TRANEXAMIC ACID 650 MG/1
1300 TABLET ORAL
COMMUNITY

## 2025-01-21 RX ORDER — FAMOTIDINE 20 MG/1
20 TABLET, FILM COATED ORAL 2 TIMES DAILY
COMMUNITY

## 2025-01-21 RX ORDER — MEDROXYPROGESTERONE ACETATE 10 MG
10 TABLET ORAL DAILY
COMMUNITY

## 2025-01-21 SDOH — ECONOMIC STABILITY: INCOME INSECURITY: IN THE LAST 12 MONTHS, WAS THERE A TIME WHEN YOU WERE NOT ABLE TO PAY THE MORTGAGE OR RENT ON TIME?: NO

## 2025-01-21 SDOH — ECONOMIC STABILITY: FOOD INSECURITY: WITHIN THE PAST 12 MONTHS, THE FOOD YOU BOUGHT JUST DIDN'T LAST AND YOU DIDN'T HAVE MONEY TO GET MORE.: NEVER TRUE

## 2025-01-21 SDOH — ECONOMIC STABILITY: FOOD INSECURITY: WITHIN THE PAST 12 MONTHS, YOU WORRIED THAT YOUR FOOD WOULD RUN OUT BEFORE YOU GOT MONEY TO BUY MORE.: NEVER TRUE

## 2025-01-21 SDOH — ECONOMIC STABILITY: TRANSPORTATION INSECURITY
IN THE PAST 12 MONTHS, HAS THE LACK OF TRANSPORTATION KEPT YOU FROM MEDICAL APPOINTMENTS OR FROM GETTING MEDICATIONS?: NO

## 2025-01-21 ASSESSMENT — ANXIETY QUESTIONNAIRES
1. FEELING NERVOUS, ANXIOUS, OR ON EDGE: NOT AT ALL
7. FEELING AFRAID AS IF SOMETHING AWFUL MIGHT HAPPEN: NOT AT ALL
2. NOT BEING ABLE TO STOP OR CONTROL WORRYING: NOT AT ALL
6. BECOMING EASILY ANNOYED OR IRRITABLE: NOT AT ALL
3. WORRYING TOO MUCH ABOUT DIFFERENT THINGS: NOT AT ALL
GAD7 TOTAL SCORE: 0
4. TROUBLE RELAXING: NOT AT ALL
5. BEING SO RESTLESS THAT IT IS HARD TO SIT STILL: NOT AT ALL

## 2025-01-21 ASSESSMENT — PATIENT HEALTH QUESTIONNAIRE - PHQ9
SUM OF ALL RESPONSES TO PHQ QUESTIONS 1-9: 7
1. LITTLE INTEREST OR PLEASURE IN DOING THINGS: NOT AT ALL
SUM OF ALL RESPONSES TO PHQ QUESTIONS 1-9: 7
7. TROUBLE CONCENTRATING ON THINGS, SUCH AS READING THE NEWSPAPER OR WATCHING TELEVISION: NEARLY EVERY DAY
3. TROUBLE FALLING OR STAYING ASLEEP: SEVERAL DAYS
6. FEELING BAD ABOUT YOURSELF - OR THAT YOU ARE A FAILURE OR HAVE LET YOURSELF OR YOUR FAMILY DOWN: NOT AT ALL
5. POOR APPETITE OR OVEREATING: NOT AT ALL
SUM OF ALL RESPONSES TO PHQ9 QUESTIONS 1 & 2: 0
2. FEELING DOWN, DEPRESSED OR HOPELESS: NOT AT ALL
8. MOVING OR SPEAKING SO SLOWLY THAT OTHER PEOPLE COULD HAVE NOTICED. OR THE OPPOSITE, BEING SO FIGETY OR RESTLESS THAT YOU HAVE BEEN MOVING AROUND A LOT MORE THAN USUAL: NOT AT ALL
SUM OF ALL RESPONSES TO PHQ QUESTIONS 1-9: 7
SUM OF ALL RESPONSES TO PHQ QUESTIONS 1-9: 7
4. FEELING TIRED OR HAVING LITTLE ENERGY: NEARLY EVERY DAY
9. THOUGHTS THAT YOU WOULD BE BETTER OFF DEAD, OR OF HURTING YOURSELF: NOT AT ALL

## 2025-01-21 NOTE — PROGRESS NOTES
Room 19 20     When asked if patient has seen the (referral) patient states . Patient referral order has been re-printed and address has been highlighted for patient.    Carolynn Doe had concerns including Follow-Up from Hospital. for today's visit .     When asked if patient has any concerns she/he would like to address with JAI Chanel, I do but I can't think of them right now. JAI Chanel has been notified of patient concerns .      Did you take your medication today? yes      1. \"Have you been to the ER, urgent care clinic since your last visit?  Hospitalized since your last visit?\" . 01/15/2025 - Roman Barros    2. \"Have you seen or consulted any other health care providers outside of the [facility] since your last visit?\" NO    3. For patients aged 45-75: Has the patient had a colonoscopy / FIT/ Cologuard? N/A      If the patient is female:    4. For patients aged 40-74: Has the patient had a mammogram within the past 2 years? N/A       5. For patients aged 21-65: Has the patient had a pap smear? {Cancer Care Gap present? NO    When asked if patient menstrual cycle is normal patient states no .          1/21/2025    11:18 AM   PHQ-9    Little interest or pleasure in doing things 0   Feeling down, depressed, or hopeless 0   Trouble falling or staying asleep, or sleeping too much 1   Feeling tired or having little energy 3   Poor appetite or overeating 0   Feeling bad about yourself - or that you are a failure or have let yourself or your family down 0   Trouble concentrating on things, such as reading the newspaper or watching television 3   Moving or speaking so slowly that other people could have noticed. Or the opposite - being so fidgety or restless that you have been moving around a lot more than usual 0   Thoughts that you would be better off dead, or of hurting yourself in some way 0   PHQ-2 Score 0   PHQ-9 Total Score 7            1/21/2025    11:19 AM   CANDI-7 SCREENING

## 2025-01-21 NOTE — PROGRESS NOTES
[address] [place] [address]               [phone]      Carolynn Doe is a 39 y.o. female and presents with Follow-Up from Hospital       Assessment/Plan:    1. Hospital discharge follow-up  -     [place] DISCHARGE MEDS RECONCILED W/ CURRENT OUTPATIENT MED LIST  2. Uterine leiomyoma, unspecified location  Assessment & Plan:    Patient referred to GYN several times; seen by Dr. Kaiser in hospital and new referral sent. Pt had D&C and has upcoming surgery scheduled.  3. Thickened endometrium  Assessment & Plan:   Monitored by specialist- no acute findings meriting change in the plan  Scheduled for upcoming surgery with GYN/GYN-oncology  4. Iron deficiency anemia due to chronic blood loss  5. Postprocedural hypothyroidism  6. Vaginal bleeding  Assessment & Plan:   Monitored by specialist- no acute findings meriting change in the plan  Scheduled for upcoming surgery with GYN/GYN-oncology           Follow up and disposition:   No follow-ups on file.      Subjective:    Labs obtained prior to visit? Yes  Reviewed with patient? yes    Hysterectomy and dilation and curettage in hospital     Seeing Dr. Jean 1/27/2025 and GYN specialist 01/23/2025    ROS:     Review of Systems   Constitutional:  Positive for fatigue.   HENT:  Negative for trouble swallowing and voice change.    Respiratory:  Negative for cough, choking, chest tightness, wheezing and stridor.    Cardiovascular:  Negative for palpitations and leg swelling.   Gastrointestinal:  Negative for abdominal pain, anal bleeding and blood in stool.   Genitourinary:  Positive for pelvic pain and vaginal bleeding. Negative for decreased urine volume, dysuria, vaginal discharge and vaginal pain.   Allergic/Immunologic: Positive for environmental allergies.   Neurological:  Negative for headaches.   Psychiatric/Behavioral:  Negative for dysphoric mood, sleep disturbance and suicidal ideas. The patient is

## 2025-01-27 PROBLEM — Z12.39 ENCOUNTER FOR BREAST CANCER SCREENING OTHER THAN MAMMOGRAM: Status: RESOLVED | Noted: 2025-01-06 | Resolved: 2025-01-27

## 2025-01-27 ASSESSMENT — ENCOUNTER SYMPTOMS
ANAL BLEEDING: 0
STRIDOR: 0
VOICE CHANGE: 0
CHOKING: 0
ABDOMINAL PAIN: 0
COUGH: 0
TROUBLE SWALLOWING: 0
BLOOD IN STOOL: 0
CHEST TIGHTNESS: 0
WHEEZING: 0

## 2025-01-27 NOTE — ASSESSMENT & PLAN NOTE
Monitored by specialist- no acute findings meriting change in the plan  Scheduled for upcoming surgery with GYN/GYN-oncology

## 2025-01-27 NOTE — ASSESSMENT & PLAN NOTE
Patient referred to GYN several times; seen by Dr. Kaiser in hospital and new referral sent. Pt had D&C and has upcoming surgery scheduled.

## 2025-01-31 ENCOUNTER — TRANSCRIBE ORDERS (OUTPATIENT)
Facility: HOSPITAL | Age: 40
End: 2025-01-31

## 2025-01-31 DIAGNOSIS — D25.9 UTERINE LEIOMYOMA, UNSPECIFIED LOCATION: Primary | ICD-10-CM

## 2025-01-31 DIAGNOSIS — N85.8 FIBROSIS OF UTERUS: ICD-10-CM

## 2025-02-05 ENCOUNTER — CLINICAL DOCUMENTATION (OUTPATIENT)
Facility: CLINIC | Age: 40
End: 2025-02-05

## 2025-02-05 DIAGNOSIS — K62.5 RECTAL BLEEDING: Primary | ICD-10-CM

## 2025-02-05 NOTE — PROGRESS NOTES
I received a fax from [facility] on 2/3/25 at 3: 20 PM  I only received 2 of the 7 pages. It looks like the patient needs referred to Dr Martel (He is with [facility]) And it is listed as Stat; but the referral has to come from the patients PCM due to [facility] [employer] rules.     2/3/25: I called Yaritza (Dr Jean's MA) at [facility] at [phone] who sent the fax. I left a VM message stating to please send the last 5 pages so I could move forward with the request and I wanted to verify that I am understanding the information based on the 1 page I can see (the other page is a cover sheet) .   2/4/25 She called me back, but I was with a patient . I called her again and had to leave another message. So at 4:30 PM I also faxed a note with the inclusion of the 2 pages I had received  so she could she the only information I had received. (it went through at 4:39 PM)   I have not received any other records at this time. I will ask Imelda if she has enough information to place the referral so I can enter in [facility] to start the authorization process. And I will await the additional records from [facility] in the meantime.

## 2025-02-05 NOTE — PROGRESS NOTES
Patient seeing Dr. Jean for endometrial thickening and uterine mass. Mass is negative for malignancy but patient having bright red blood per rectum per surgeon and requires a STAT colorectal referral to Dr. Martel (with [facility]) but has to be completed by PCP. Referral placed as urgent.

## 2025-02-06 PROBLEM — N71.9 ENDOMETRITIS: Status: ACTIVE | Noted: 2024-01-23

## 2025-02-06 RX ORDER — VENLAFAXINE HYDROCHLORIDE 37.5 MG/1
CAPSULE, EXTENDED RELEASE ORAL
COMMUNITY

## 2025-02-10 ENCOUNTER — HOSPITAL ENCOUNTER (OUTPATIENT)
Facility: HOSPITAL | Age: 40
Discharge: HOME | End: 2025-02-13
Attending: OBSTETRICS & GYNECOLOGY
Payer: OTHER GOVERNMENT

## 2025-02-10 ENCOUNTER — HOSPITAL ENCOUNTER (EMERGENCY)
Facility: HOSPITAL | Age: 40
Discharge: HOME | End: 2025-02-10
Attending: EMERGENCY MEDICINE
Payer: OTHER GOVERNMENT

## 2025-02-10 VITALS
BODY MASS INDEX: 29.15 KG/M2 | WEIGHT: 181.4 LBS | DIASTOLIC BLOOD PRESSURE: 64 MMHG | TEMPERATURE: 98.4 F | SYSTOLIC BLOOD PRESSURE: 111 MMHG | RESPIRATION RATE: 18 BRPM | HEART RATE: 82 BPM | OXYGEN SATURATION: 97 % | HEIGHT: 66 IN

## 2025-02-10 DIAGNOSIS — N85.8 FIBROSIS OF UTERUS: ICD-10-CM

## 2025-02-10 DIAGNOSIS — D25.9 UTERINE LEIOMYOMA, UNSPECIFIED LOCATION: Primary | ICD-10-CM

## 2025-02-10 DIAGNOSIS — D25.9 UTERINE LEIOMYOMA, UNSPECIFIED LOCATION: ICD-10-CM

## 2025-02-10 LAB
ANION GAP SERPL CALC-SCNC: 9 MMOL/L (ref 3–18)
BASOPHILS # BLD: 0.04 K/UL (ref 0–0.1)
BASOPHILS NFR BLD: 0.5 % (ref 0–2)
BUN SERPL-MCNC: 12 MG/DL (ref 7–18)
BUN/CREAT SERPL: 15 (ref 12–20)
CALCIUM SERPL-MCNC: 9.5 MG/DL (ref 8.5–10.1)
CHLORIDE SERPL-SCNC: 106 MMOL/L (ref 100–111)
CO2 SERPL-SCNC: 21 MMOL/L (ref 21–32)
CREAT SERPL-MCNC: 0.79 MG/DL (ref 0.6–1.3)
DIFFERENTIAL METHOD BLD: ABNORMAL
EOSINOPHIL # BLD: 0.2 K/UL (ref 0–0.4)
EOSINOPHIL NFR BLD: 2.7 % (ref 0–5)
ERYTHROCYTE [DISTWIDTH] IN BLOOD BY AUTOMATED COUNT: 17.2 % (ref 11.6–14.5)
GLUCOSE SERPL-MCNC: 84 MG/DL (ref 74–99)
HCT VFR BLD AUTO: 32.4 % (ref 35–45)
HGB BLD-MCNC: 10.1 G/DL (ref 12–16)
IMM GRANULOCYTES # BLD AUTO: 0.01 K/UL (ref 0–0.04)
IMM GRANULOCYTES NFR BLD AUTO: 0.1 % (ref 0–0.5)
LYMPHOCYTES # BLD: 4.08 K/UL (ref 0.9–3.6)
LYMPHOCYTES NFR BLD: 54.5 % (ref 21–52)
MCH RBC QN AUTO: 22.7 PG (ref 24–34)
MCHC RBC AUTO-ENTMCNC: 31.2 G/DL (ref 31–37)
MCV RBC AUTO: 73 FL (ref 78–100)
MONOCYTES # BLD: 0.64 K/UL (ref 0.05–1.2)
MONOCYTES NFR BLD: 8.5 % (ref 3–10)
NEUTS SEG # BLD: 2.52 K/UL (ref 1.8–8)
NEUTS SEG NFR BLD: 33.7 % (ref 40–73)
NRBC # BLD: 0 K/UL (ref 0–0.01)
NRBC BLD-RTO: 0 PER 100 WBC
PLATELET # BLD AUTO: 421 K/UL (ref 135–420)
PMV BLD AUTO: 9.8 FL (ref 9.2–11.8)
POTASSIUM SERPL-SCNC: 3.7 MMOL/L (ref 3.5–5.5)
RBC # BLD AUTO: 4.44 M/UL (ref 4.2–5.3)
SODIUM SERPL-SCNC: 136 MMOL/L (ref 136–145)
WBC # BLD AUTO: 7.5 K/UL (ref 4.6–13.2)

## 2025-02-10 PROCEDURE — 6370000000 HC RX 637 (ALT 250 FOR IP): Performed by: EMERGENCY MEDICINE

## 2025-02-10 PROCEDURE — 99284 EMERGENCY DEPT VISIT MOD MDM: CPT

## 2025-02-10 PROCEDURE — 6360000004 HC RX CONTRAST MEDICATION: Performed by: OBSTETRICS & GYNECOLOGY

## 2025-02-10 PROCEDURE — 96376 TX/PRO/DX INJ SAME DRUG ADON: CPT

## 2025-02-10 PROCEDURE — 85025 COMPLETE CBC W/AUTO DIFF WBC: CPT

## 2025-02-10 PROCEDURE — 96374 THER/PROPH/DIAG INJ IV PUSH: CPT

## 2025-02-10 PROCEDURE — 80048 BASIC METABOLIC PNL TOTAL CA: CPT

## 2025-02-10 PROCEDURE — 72197 MRI PELVIS W/O & W/DYE: CPT

## 2025-02-10 PROCEDURE — A9577 INJ MULTIHANCE: HCPCS | Performed by: OBSTETRICS & GYNECOLOGY

## 2025-02-10 PROCEDURE — 6360000002 HC RX W HCPCS: Performed by: EMERGENCY MEDICINE

## 2025-02-10 RX ORDER — OXYCODONE AND ACETAMINOPHEN 5; 325 MG/1; MG/1
1 TABLET ORAL
Status: COMPLETED | OUTPATIENT
Start: 2025-02-10 | End: 2025-02-10

## 2025-02-10 RX ORDER — OXYCODONE AND ACETAMINOPHEN 5; 325 MG/1; MG/1
1 TABLET ORAL EVERY 6 HOURS PRN
Qty: 20 TABLET | Refills: 0 | Status: SHIPPED | OUTPATIENT
Start: 2025-02-10 | End: 2025-02-13

## 2025-02-10 RX ADMIN — GADOBENATE DIMEGLUMINE 20 ML: 529 INJECTION, SOLUTION INTRAVENOUS at 14:20

## 2025-02-10 RX ADMIN — HYDROMORPHONE HYDROCHLORIDE 0.5 MG: 1 INJECTION, SOLUTION INTRAMUSCULAR; INTRAVENOUS; SUBCUTANEOUS at 14:51

## 2025-02-10 RX ADMIN — OXYCODONE HYDROCHLORIDE AND ACETAMINOPHEN 1 TABLET: 5; 325 TABLET ORAL at 17:29

## 2025-02-10 RX ADMIN — HYDROMORPHONE HYDROCHLORIDE 0.5 MG: 1 INJECTION, SOLUTION INTRAMUSCULAR; INTRAVENOUS; SUBCUTANEOUS at 16:56

## 2025-02-10 ASSESSMENT — PAIN DESCRIPTION - FREQUENCY
FREQUENCY: CONTINUOUS
FREQUENCY: CONTINUOUS

## 2025-02-10 ASSESSMENT — PAIN DESCRIPTION - LOCATION
LOCATION: ABDOMEN

## 2025-02-10 ASSESSMENT — LIFESTYLE VARIABLES
HOW MANY STANDARD DRINKS CONTAINING ALCOHOL DO YOU HAVE ON A TYPICAL DAY: PATIENT DOES NOT DRINK
HOW OFTEN DO YOU HAVE A DRINK CONTAINING ALCOHOL: NEVER

## 2025-02-10 ASSESSMENT — PAIN DESCRIPTION - PAIN TYPE
TYPE: ACUTE PAIN
TYPE: ACUTE PAIN

## 2025-02-10 ASSESSMENT — PAIN - FUNCTIONAL ASSESSMENT: PAIN_FUNCTIONAL_ASSESSMENT: 0-10

## 2025-02-10 ASSESSMENT — PAIN SCALES - GENERAL
PAINLEVEL_OUTOF10: 10
PAINLEVEL_OUTOF10: 5
PAINLEVEL_OUTOF10: 7

## 2025-02-10 NOTE — ED PROVIDER NOTES
EMERGENCY DEPARTMENT HISTORY AND PHYSICAL EXAM    2:27 PM EST seen at this time in room 4        Date: 2/10/2025  Patient Name: Carolynn Doe    History of Presenting Illness     Chief Complaint   Patient presents with    Abdominal Pain    Flank Pain         History Provided By: patient    Additional History (Context): Carolynn Doe is a 39 y.o. female presents with brought test from an outpatient MRI, her pain was too severe so she came to the ER.  I find records of uterine leiomyoma and is managed by [facility].    PCP: Imelda Chanel APRN - NP    Chief Complaint:   Duration:    Timing:    Location:   Quality:   Severity:   Modifying Factors:   Associated Symptoms:       No current facility-administered medications for this encounter.     Current Outpatient Medications   Medication Sig Dispense Refill    oxyCODONE-acetaminophen (PERCOCET) 5-325 MG per tablet Take 1 tablet by mouth every 6 hours as needed for Pain for up to 3 days. Intended supply: 3 days. Take lowest dose possible to manage pain Max Daily Amount: 4 tablets 20 tablet 0    venlafaxine (EFFEXOR XR) 37.5 MG extended release capsule       famotidine (PEPCID) 20 MG tablet Take 1 tablet by mouth 2 times daily      omeprazole (PRILOSEC) 20 MG delayed release capsule Take 1 capsule by mouth daily      medroxyPROGESTERone (PROVERA) 10 MG tablet Take 1 tablet by mouth daily      tranexamic acid (LYSTEDA) 650 MG TABS tablet Take 2 tablets by mouth      vitamin D (ERGOCALCIFEROL) 1.25 MG (03941 UT) CAPS capsule       ferrous sulfate (IRON 325) 325 (65 Fe) MG tablet Take 1 tablet by mouth daily (with breakfast) 90 tablet 1    ascorbic acid (V-R VITAMIN C) 250 MG tablet Take 1 tablet by mouth daily 90 tablet 1    levothyroxine (SYNTHROID) 150 MCG tablet Take 1 tablet by mouth Daily 30 tablet 0    liothyronine (CYTOMEL) 5 MCG tablet Take 1 tablet by mouth daily 30 tablet 0       Past History     Past Medical  37.5 MG extended release capsule       famotidine (PEPCID) 20 MG tablet Take 1 tablet by mouth 2 times daily      omeprazole (PRILOSEC) 20 MG delayed release capsule Take 1 capsule by mouth daily      medroxyPROGESTERone (PROVERA) 10 MG tablet Take 1 tablet by mouth daily      tranexamic acid (LYSTEDA) 650 MG TABS tablet Take 2 tablets by mouth      vitamin D (ERGOCALCIFEROL) 1.25 MG (61217 UT) CAPS capsule       ferrous sulfate (IRON 325) 325 (65 Fe) MG tablet Take 1 tablet by mouth daily (with breakfast)  Qty: 90 tablet, Refills: 1    Associated Diagnoses: Iron deficiency anemia due to chronic blood loss      ascorbic acid (V-R VITAMIN C) 250 MG tablet Take 1 tablet by mouth daily  Qty: 90 tablet, Refills: 1    Associated Diagnoses: Iron deficiency anemia due to chronic blood loss      levothyroxine (SYNTHROID) 150 MCG tablet Take 1 tablet by mouth Daily  Qty: 30 tablet, Refills: 0    Associated Diagnoses: Postprocedural hypothyroidism      liothyronine (CYTOMEL) 5 MCG tablet Take 1 tablet by mouth daily  Qty: 30 tablet, Refills: 0    Associated Diagnoses: Postprocedural hypothyroidism             DISCONTINUED MEDICATIONS:  Current Discharge Medication List          PATIENT REFERRED TO:  Follow Up with:  Matthew Jean MD  [address]  [address] [address] [phone]          _______________________________    Clinical Impression:   1. Uterine leiomyoma, unspecified location         Cisco Raymundo MD using Dragon dictation      _______________________________     Cisco Raymundo MD  02/10/25 [id number]

## 2025-02-11 PROBLEM — K21.9 GASTROESOPHAGEAL REFLUX DISEASE: Status: ACTIVE | Noted: 2025-02-11

## 2025-02-11 PROBLEM — Z91.040: Status: ACTIVE | Noted: 2025-02-11

## 2025-02-21 ENCOUNTER — TELEMEDICINE (OUTPATIENT)
Facility: CLINIC | Age: 40
End: 2025-02-21

## 2025-02-21 DIAGNOSIS — E89.0 POSTPROCEDURAL HYPOTHYROIDISM: ICD-10-CM

## 2025-02-21 DIAGNOSIS — D25.9 UTERINE LEIOMYOMA, UNSPECIFIED LOCATION: ICD-10-CM

## 2025-02-21 DIAGNOSIS — Z87.442 HISTORY OF KIDNEY STONES: ICD-10-CM

## 2025-02-21 DIAGNOSIS — R10.9 LEFT FLANK PAIN: ICD-10-CM

## 2025-02-21 DIAGNOSIS — N80.9 ENDOMETRIOSIS: ICD-10-CM

## 2025-02-21 DIAGNOSIS — Z13.31 POSITIVE DEPRESSION SCREENING: Primary | ICD-10-CM

## 2025-02-21 RX ORDER — OXYCODONE AND ACETAMINOPHEN 5; 325 MG/1; MG/1
1 TABLET ORAL EVERY 6 HOURS PRN
Qty: 20 TABLET | Refills: 0 | Status: SHIPPED | OUTPATIENT
Start: 2025-02-21 | End: 2025-02-26

## 2025-02-21 RX ORDER — CIPROFLOXACIN 250 MG/1
250 TABLET, FILM COATED ORAL 2 TIMES DAILY
Qty: 14 TABLET | Refills: 0 | Status: SHIPPED | OUTPATIENT
Start: 2025-02-21 | End: 2025-02-28

## 2025-02-21 RX ORDER — OXYCODONE AND ACETAMINOPHEN 5; 325 MG/1; MG/1
TABLET ORAL
COMMUNITY
End: 2025-02-21 | Stop reason: SDUPTHER

## 2025-02-21 SDOH — ECONOMIC STABILITY: FOOD INSECURITY: WITHIN THE PAST 12 MONTHS, YOU WORRIED THAT YOUR FOOD WOULD RUN OUT BEFORE YOU GOT MONEY TO BUY MORE.: NEVER TRUE

## 2025-02-21 SDOH — ECONOMIC STABILITY: FOOD INSECURITY: WITHIN THE PAST 12 MONTHS, THE FOOD YOU BOUGHT JUST DIDN'T LAST AND YOU DIDN'T HAVE MONEY TO GET MORE.: NEVER TRUE

## 2025-02-21 ASSESSMENT — PATIENT HEALTH QUESTIONNAIRE - PHQ9
4. FEELING TIRED OR HAVING LITTLE ENERGY: NEARLY EVERY DAY
10. IF YOU CHECKED OFF ANY PROBLEMS, HOW DIFFICULT HAVE THESE PROBLEMS MADE IT FOR YOU TO DO YOUR WORK, TAKE CARE OF THINGS AT HOME, OR GET ALONG WITH OTHER PEOPLE: SOMEWHAT DIFFICULT
2. FEELING DOWN, DEPRESSED OR HOPELESS: NOT AT ALL
8. MOVING OR SPEAKING SO SLOWLY THAT OTHER PEOPLE COULD HAVE NOTICED. OR THE OPPOSITE, BEING SO FIGETY OR RESTLESS THAT YOU HAVE BEEN MOVING AROUND A LOT MORE THAN USUAL: NEARLY EVERY DAY
SUM OF ALL RESPONSES TO PHQ QUESTIONS 1-9: 12
9. THOUGHTS THAT YOU WOULD BE BETTER OFF DEAD, OR OF HURTING YOURSELF: NOT AT ALL
5. POOR APPETITE OR OVEREATING: MORE THAN HALF THE DAYS
7. TROUBLE CONCENTRATING ON THINGS, SUCH AS READING THE NEWSPAPER OR WATCHING TELEVISION: NEARLY EVERY DAY
SUM OF ALL RESPONSES TO PHQ QUESTIONS 1-9: 12
SUM OF ALL RESPONSES TO PHQ QUESTIONS 1-9: 12
1. LITTLE INTEREST OR PLEASURE IN DOING THINGS: NOT AT ALL
3. TROUBLE FALLING OR STAYING ASLEEP: SEVERAL DAYS
SUM OF ALL RESPONSES TO PHQ9 QUESTIONS 1 & 2: 0
SUM OF ALL RESPONSES TO PHQ QUESTIONS 1-9: 12
6. FEELING BAD ABOUT YOURSELF - OR THAT YOU ARE A FAILURE OR HAVE LET YOURSELF OR YOUR FAMILY DOWN: NOT AT ALL

## 2025-02-21 ASSESSMENT — ANXIETY QUESTIONNAIRES
7. FEELING AFRAID AS IF SOMETHING AWFUL MIGHT HAPPEN: NOT AT ALL
1. FEELING NERVOUS, ANXIOUS, OR ON EDGE: NOT AT ALL
2. NOT BEING ABLE TO STOP OR CONTROL WORRYING: SEVERAL DAYS
GAD7 TOTAL SCORE: 4
5. BEING SO RESTLESS THAT IT IS HARD TO SIT STILL: MORE THAN HALF THE DAYS
6. BECOMING EASILY ANNOYED OR IRRITABLE: NOT AT ALL
4. TROUBLE RELAXING: SEVERAL DAYS
3. WORRYING TOO MUCH ABOUT DIFFERENT THINGS: NOT AT ALL

## 2025-02-21 NOTE — ASSESSMENT & PLAN NOTE
Orders:    ciprofloxacin (CIPRO) 250 MG tablet; Take 1 tablet by mouth 2 times daily for 7 days    oxyCODONE-acetaminophen (PERCOCET) 5-325 MG per tablet; Take 1 tablet by mouth every 6 hours as needed for Pain for up to 5 days. Max Daily Amount: 4 tablets

## 2025-02-21 NOTE — PROGRESS NOTES
Virtual    What is your preferred Pronouns? She/her     Carolynnaditya Doe had concerns including Follow-up. for today's visit .     When asked if patient has any concerns she would like to address with JAI Chanel I can't think of anything off the top of my head.       Did you take your medication today?  Yes      1. \"Have you been to the ER, urgent care clinic since your last visit?  Hospitalized since your last visit?\" Yes, 2/10/2025 I went in for an MRI and ended up in the ER      2. \"Have you seen or consulted any other health care providers outside of the [facility] since your last visit?\" No    3. For patients aged 45-75: Has the patient had a colonoscopy / FIT/ Cologuard? N/A      If the patient is female:    4. For patients aged 40-74: Has the patient had a mammogram within the past 2 years? N/A      5. For patients aged 21-65: Has the patient had a pap smear? {Cancer Care Gap present? Yes    When asked if patient menstrual cycle is normal patient states no . Feb 8 to current          2/21/2025    11:05 AM   PHQ-9    Little interest or pleasure in doing things 0   Feeling down, depressed, or hopeless 0   Trouble falling or staying asleep, or sleeping too much 1   Feeling tired or having little energy 3   Poor appetite or overeating 2   Feeling bad about yourself - or that you are a failure or have let yourself or your family down 0   Trouble concentrating on things, such as reading the newspaper or watching television 3   Moving or speaking so slowly that other people could have noticed. Or the opposite - being so fidgety or restless that you have been moving around a lot more than usual 3   Thoughts that you would be better off dead, or of hurting yourself in some way 0   PHQ-2 Score 0   PHQ-9 Total Score 12   If you checked off any problems, how difficult have these problems made it for you to do your work, take care of things at home, or get along with other people? 1

## 2025-02-21 NOTE — PROGRESS NOTES
Carolynn Doe, was evaluated through a synchronous (real-time) audio-video encounter. The patient (or guardian if applicable) is aware that this is a billable service, which includes applicable co-pays. This Virtual Visit was conducted with patient's (and/or legal guardian's) consent. Patient identification was verified, and a caregiver was present when appropriate.   The patient was located at Home: [address]  [place] [address]  Provider was located at Home (Appt Dept State): [place]  Confirm you are appropriately licensed, registered, or certified to deliver care in the state where the patient is located as indicated above. If you are not or unsure, please re-schedule the visit: Yes, I confirm.     Carolynn Doe (:  1985) is a Established patient, presenting virtually for evaluation of the following:      Below is the assessment and plan developed based on review of pertinent history, physical exam, labs, studies, and medications.     Assessment & Plan  Positive depression screening    Patient is trying to get with in Dr. Deon Martel for colorectal surgery for possible fistula          Endometriosis   --not controlled; patient seeing Dr. Reed, Dr. Kaiser          Postprocedural hypothyroidism   Chronic, at goal (stable), continue current treatment plan         Left flank pain       Orders:    ciprofloxacin (CIPRO) 250 MG tablet; Take 1 tablet by mouth 2 times daily for 7 days    oxyCODONE-acetaminophen (PERCOCET) 5-325 MG per tablet; Take 1 tablet by mouth every 6 hours as needed for Pain for up to 5 days. Max Daily Amount: 4 tablets    Uterine leiomyoma, unspecified location   --monitored by specialist         History of kidney stones    Patient having left sided flank pain    Orders:    ciprofloxacin (CIPRO) 250 MG tablet; Take 1 tablet by mouth 2 times daily for 7 days    oxyCODONE-acetaminophen (PERCOCET) 5-325 MG per tablet; Take 1 tablet by mouth

## 2025-02-21 NOTE — ASSESSMENT & PLAN NOTE
Patient is trying to get with in Dr. Deno Martel for colorectal surgery for possible fistula

## 2025-03-24 ENCOUNTER — CLINICAL DOCUMENTATION (OUTPATIENT)
Facility: CLINIC | Age: 40
End: 2025-03-24

## 2025-03-24 NOTE — PROGRESS NOTES
I received a fax from [facility] requesting all records -progress notes, labs, and Imaging. I called the office to ask if they needed “ALL” notes etc. I was told just the recent notes and labs and imaging relevant to the reason pt is being seen.   Faxed these records to: [phone] :    OV notes from  2/21/25, 1/27/25, 1/6/25    Documentation/ messages from 2/5/25 and 1/16/25     Labs from 1/18/25 and 1/15 and 1/16/25     Imaging: Recent  MRI pelvis and CT Abd/pelvis and Ultrasound pelvic and 12/6/24 U/S transvag also    See original colorectal referral that was closed. [facility] pulled pt back in to [facility] but is now referring her to Dr Martel office

## 2025-04-04 DIAGNOSIS — N92.1 MENORRHAGIA WITH IRREGULAR CYCLE: ICD-10-CM

## 2025-04-04 DIAGNOSIS — D25.9 UTERINE LEIOMYOMA, UNSPECIFIED LOCATION: Primary | ICD-10-CM

## 2025-04-04 RX ORDER — OXYCODONE AND ACETAMINOPHEN 5; 325 MG/1; MG/1
1 TABLET ORAL EVERY 8 HOURS PRN
Qty: 9 TABLET | Refills: 0 | Status: SHIPPED | OUTPATIENT
Start: 2025-04-04 | End: 2025-04-07

## 2025-04-04 RX ORDER — FAMOTIDINE 20 MG/1
20 TABLET, FILM COATED ORAL 2 TIMES DAILY
Qty: 120 TABLET | Refills: 1 | Status: SHIPPED | OUTPATIENT
Start: 2025-04-04

## 2025-04-04 RX ORDER — POLYETHYLENE GLYCOL 3350 17 G/17G
POWDER, FOR SOLUTION ORAL
COMMUNITY
Start: 2025-04-04

## 2025-04-04 NOTE — TELEPHONE ENCOUNTER
Patient : Pasha Washington Age: 28 year old Sex: male   MRN: [id number] Encounter Date: 3/15/2024      History     Chief Complaint   Patient presents with    Laceration     28-year-old male presenting with a laceration to his right hand.  He cut himself with a  when he was making dinner tonight.  Complaining of a lot of oozing.  He applied a tourniquet to his right upper extremity because of the bleeding and because he wanted to stay awake while driving. He says he worked a long 10 hour shift. Last tetanus was in 2018.     The history is provided by the patient.           No Known Allergies    There are no discharge medications for this patient.      No past medical history on file.    No past surgical history on file.    No family history on file.         E-cigarette/Vaping     E-Cigarette/Vaping Substances & Devices       See HPI for ROS     Physical Exam     ED Triage Vitals [03/15/24 2112]   ED Triage Vitals Group      Temp 97.8 °F (36.6 °C)      Heart Rate (!) 105      Resp 18      BP (!) 171/107      SpO2 96 %      EtCO2 mmHg       Height       Weight 222 lb 3.6 oz (100.8 kg)      Weight Scale Used Standing scale      BMI (Calculated)       IBW/kg (Calculated)        Physical Exam  Vitals and nursing note reviewed.   Constitutional:       Appearance: Normal appearance.   HENT:      Head: Normocephalic and atraumatic.      Nose: Nose normal.   Eyes:      Extraocular Movements: Extraocular movements intact.      Conjunctiva/sclera: Conjunctivae normal.      Pupils: Pupils are equal, round, and reactive to light.   Cardiovascular:      Rate and Rhythm: Normal rate.   Pulmonary:      Effort: Pulmonary effort is normal.   Abdominal:      General: There is no distension.   Musculoskeletal:         General: No swelling or deformity.      Cervical back: Normal range of motion.   Skin:     General: Skin is warm and dry.      Comments: Avulsion to tip of R thumb, spares nail, copious oozing    Neurological:  Patient seeing GI surgeon and schedule for colonoscopy this month. Pt still having irregular vaginal bleeding with severe cramping; will send small course of Percocet. PDMP reviewed--advised patient no further refills.         Mental Status: He is alert.      Cranial Nerves: No cranial nerve deficit.      Motor: No weakness.      Coordination: Coordination normal.   Psychiatric:         Mood and Affect: Mood normal.         ED Course     Laceration Repair    Date/Time: 3/15/2024 10:20 PM    Performed by: Princess Arita MD  Authorized by: Princess Arita MD    Consent:     Consent obtained:  Verbal  Anesthesia:     Anesthesia method:  Nerve block    Block location:  1st finger block    Block needle gauge:  24 G    Block anesthetic:  Lidocaine 1% w/o epi    Block outcome:  Anesthesia achieved  Laceration details:     Location:  Finger    Finger location:  R thumb    Wound length (cm): 1 cm avulsion.  Exploration:     Contaminated: no    Treatment:     Area cleansed with:  Saline and chlorhexidine    Amount of cleaning:  Extensive    Irrigation method:  Syringe  Skin repair:     Repair method: quick clot.  Post-procedure details:     Dressing:  Tube gauze    Procedure completion:  Tolerated well, no immediate complications        Radiology Results     Imaging Results    None         ED Medication Orders (From admission, onward)      Ordered Start     Status Ordering Provider    03/15/24 2114 03/15/24 2115  lidocaine HCl (PF) (XYLOCAINE) 1 % injection 200 mg  ONCE         Last MAR action: Given by Other PRINCESS ARITA                 Medical Decision Making  28-year-old male presenting with a right 1st fingertip avulsion.  The area was cleaned and explored with finger tourniquet in place.  The area was covered with quick clot and tube gauze, not amenable to skin glue or sutures.        Clinical Impression     ED Diagnosis   1. Avulsion of finger tip, initial encounter            Disposition        Discharge after Treatment 3/15/2024 10:19 PM  There is no comment         Princess Arita MD  03/15/24 2200

## 2025-04-15 PROBLEM — N80.9 ENDOMETRIOSIS: Status: ACTIVE | Noted: 2025-03-12

## 2025-04-15 RX ORDER — ACETAMINOPHEN 325 MG/1
650 TABLET ORAL EVERY 4 HOURS PRN
COMMUNITY
Start: 2025-04-12

## 2025-04-15 RX ORDER — PHENAZOPYRIDINE HYDROCHLORIDE 200 MG/1
200 TABLET, FILM COATED ORAL
COMMUNITY
Start: 2025-04-12

## 2025-04-15 RX ORDER — OXYCODONE HYDROCHLORIDE 5 MG/1
5 TABLET ORAL EVERY 6 HOURS PRN
COMMUNITY
Start: 2025-04-12

## 2025-04-15 RX ORDER — MECLIZINE HCL 12.5 MG 12.5 MG/1
12.5 TABLET ORAL EVERY 8 HOURS PRN
COMMUNITY
Start: 2025-04-12

## 2025-04-15 RX ORDER — NAPROXEN SODIUM 220 MG/1
440-660 TABLET, FILM COATED ORAL DAILY PRN
COMMUNITY

## 2025-04-15 RX ORDER — ONDANSETRON 4 MG/1
TABLET, FILM COATED ORAL
COMMUNITY
Start: 2025-04-12

## 2025-04-17 ENCOUNTER — TELEMEDICINE (OUTPATIENT)
Facility: CLINIC | Age: 40
End: 2025-04-17
Payer: OTHER GOVERNMENT

## 2025-04-17 DIAGNOSIS — Z09 HOSPITAL DISCHARGE FOLLOW-UP: Primary | ICD-10-CM

## 2025-04-17 DIAGNOSIS — K57.90 DIVERTICULOSIS: ICD-10-CM

## 2025-04-17 DIAGNOSIS — Z02.89 ENCOUNTER FOR COMPLETION OF FORM WITH PATIENT: ICD-10-CM

## 2025-04-17 DIAGNOSIS — N80.9 ENDOMETRIOSIS: ICD-10-CM

## 2025-04-17 DIAGNOSIS — Z82.49 FAMILY HISTORY OF DVT: ICD-10-CM

## 2025-04-17 DIAGNOSIS — R25.2 CRAMPS OF RIGHT LOWER EXTREMITY: ICD-10-CM

## 2025-04-17 DIAGNOSIS — D25.9 UTERINE LEIOMYOMA, UNSPECIFIED LOCATION: ICD-10-CM

## 2025-04-17 DIAGNOSIS — F32.89 OTHER DEPRESSION: ICD-10-CM

## 2025-04-17 DIAGNOSIS — D50.0 IRON DEFICIENCY ANEMIA DUE TO CHRONIC BLOOD LOSS: ICD-10-CM

## 2025-04-17 PROBLEM — R10.9 LEFT FLANK PAIN: Status: RESOLVED | Noted: 2023-09-25 | Resolved: 2025-04-17

## 2025-04-17 PROBLEM — Z13.31 POSITIVE DEPRESSION SCREENING: Status: RESOLVED | Noted: 2023-07-07 | Resolved: 2025-04-17

## 2025-04-17 PROBLEM — Z91.040: Status: RESOLVED | Noted: 2025-02-11 | Resolved: 2025-04-17

## 2025-04-17 PROCEDURE — 1111F DSCHRG MED/CURRENT MED MERGE: CPT | Performed by: NURSE PRACTITIONER

## 2025-04-17 PROCEDURE — 99214 OFFICE O/P EST MOD 30 MIN: CPT | Performed by: NURSE PRACTITIONER

## 2025-04-17 ASSESSMENT — PATIENT HEALTH QUESTIONNAIRE - PHQ9
9. THOUGHTS THAT YOU WOULD BE BETTER OFF DEAD, OR OF HURTING YOURSELF: NOT AT ALL
SUM OF ALL RESPONSES TO PHQ QUESTIONS 1-9: 3
7. TROUBLE CONCENTRATING ON THINGS, SUCH AS READING THE NEWSPAPER OR WATCHING TELEVISION: NOT AT ALL
SUM OF ALL RESPONSES TO PHQ QUESTIONS 1-9: 3
4. FEELING TIRED OR HAVING LITTLE ENERGY: MORE THAN HALF THE DAYS
6. FEELING BAD ABOUT YOURSELF - OR THAT YOU ARE A FAILURE OR HAVE LET YOURSELF OR YOUR FAMILY DOWN: NOT AT ALL
SUM OF ALL RESPONSES TO PHQ QUESTIONS 1-9: 3
3. TROUBLE FALLING OR STAYING ASLEEP: SEVERAL DAYS
10. IF YOU CHECKED OFF ANY PROBLEMS, HOW DIFFICULT HAVE THESE PROBLEMS MADE IT FOR YOU TO DO YOUR WORK, TAKE CARE OF THINGS AT HOME, OR GET ALONG WITH OTHER PEOPLE: NOT DIFFICULT AT ALL
SUM OF ALL RESPONSES TO PHQ QUESTIONS 1-9: 3
2. FEELING DOWN, DEPRESSED OR HOPELESS: NOT AT ALL
1. LITTLE INTEREST OR PLEASURE IN DOING THINGS: NOT AT ALL
5. POOR APPETITE OR OVEREATING: NOT AT ALL
8. MOVING OR SPEAKING SO SLOWLY THAT OTHER PEOPLE COULD HAVE NOTICED. OR THE OPPOSITE, BEING SO FIGETY OR RESTLESS THAT YOU HAVE BEEN MOVING AROUND A LOT MORE THAN USUAL: NOT AT ALL

## 2025-04-17 NOTE — ASSESSMENT & PLAN NOTE
New, uncertain prognosis, concerned for DVT due to recent multiple surgeries and hospitalization and family history of DVT  ORDERED STAT PVL     Orders:    Vascular duplex lower extremity venous right; Future

## 2025-04-17 NOTE — ASSESSMENT & PLAN NOTE
New, uncertain prognosis, STAT PVL ORDERED     Orders:    Vascular duplex lower extremity venous right; Future

## 2025-04-17 NOTE — PROGRESS NOTES
Carolynn Doe is a 39 y.o. female (: 1985) presenting to address:    Chief Complaint   Patient presents with    Follow-Up from [facility] 25  Abdominal/ Vaginal Bleeding   Next Step to what to do?  FMLA paperwork?  Specialist not calling her back   Cramp in Right Leg for a week now-very worried it could be a blood clot due to dad's hx       There were no vitals filed for this visit.    \"Have you been to the ER, urgent care clinic since your last visit?  Hospitalized since your last visit?\"    Yes, [facility] 25 to 25    “Have you seen or consulted any other health care providers outside of [facility] since your last visit?”    NO

## 2025-04-17 NOTE — ASSESSMENT & PLAN NOTE
Chronic, not at goal (unstable), s/p multiple blood transfusions. Scheduled for upcoming total hysterectomy

## 2025-04-17 NOTE — ASSESSMENT & PLAN NOTE
Monitored by specialist- no acute findings meriting change in the plan  Scheduled for total hysterectomy

## 2025-04-17 NOTE — PROGRESS NOTES
Carolynn Doe, was evaluated through a synchronous (real-time) audio-video encounter. The patient (or guardian if applicable) is aware that this is a billable service, which includes applicable co-pays. This Virtual Visit was conducted with patient's (and/or legal guardian's) consent. Patient identification was verified, and a caregiver was present when appropriate.   The patient was located at Home: [address]  [place] [address]  Provider was located at Facility (Appt Dept): [address], [address]  [place] [address]  Confirm you are appropriately licensed, registered, or certified to deliver care in the state where the patient is located as indicated above. If you are not or unsure, please re-schedule the visit: Yes, I confirm.     Carolynn Doe (:  1985) is a Established patient, presenting virtually for evaluation of the following:      Below is the assessment and plan developed based on review of pertinent history, physical exam, labs, studies, and medications.     Assessment & Plan  Hospital discharge follow-up   Reviewed labs, notes and imaging     Orders:    [place] DISCHARGE MEDS RECONCILED W/ CURRENT OUTPATIENT MED LIST    Endometriosis   Monitored by specialist- no acute findings meriting change in the plan  Scheduled for total hysterectomy          Uterine leiomyoma, unspecified location   Monitored by specialist- no acute findings meriting change in the plan         Iron deficiency anemia due to chronic blood loss   Chronic, not at goal (unstable), s/p multiple blood transfusions. Scheduled for upcoming total hysterectomy         Cramps of right lower extremity   New, uncertain prognosis, concerned for DVT due to recent multiple surgeries and hospitalization and family history of DVT  ORDERED STAT PVL     Orders:    Vascular duplex lower extremity venous right; Future    Diverticulosis   Monitored by specialist- no acute findings meriting

## 2025-04-18 ENCOUNTER — HOSPITAL ENCOUNTER (EMERGENCY)
Facility: HOSPITAL | Age: 40
Discharge: HOME | End: 2025-04-18
Payer: OTHER GOVERNMENT

## 2025-04-18 ENCOUNTER — HOSPITAL ENCOUNTER (OUTPATIENT)
Facility: HOSPITAL | Age: 40
Discharge: HOME | End: 2025-04-20
Payer: OTHER GOVERNMENT

## 2025-04-18 VITALS
HEIGHT: 65 IN | OXYGEN SATURATION: 97 % | BODY MASS INDEX: 30.82 KG/M2 | RESPIRATION RATE: 18 BRPM | HEART RATE: 91 BPM | DIASTOLIC BLOOD PRESSURE: 76 MMHG | TEMPERATURE: 98.1 F | WEIGHT: 185 LBS | SYSTOLIC BLOOD PRESSURE: 113 MMHG

## 2025-04-18 DIAGNOSIS — I82.401 ACUTE DEEP VEIN THROMBOSIS (DVT) OF RIGHT LOWER EXTREMITY, UNSPECIFIED VEIN (HCC): Primary | ICD-10-CM

## 2025-04-18 DIAGNOSIS — Z82.49 FAMILY HISTORY OF DVT: ICD-10-CM

## 2025-04-18 DIAGNOSIS — R25.2 CRAMPS OF RIGHT LOWER EXTREMITY: ICD-10-CM

## 2025-04-18 PROCEDURE — 99283 EMERGENCY DEPT VISIT LOW MDM: CPT

## 2025-04-18 PROCEDURE — 6370000000 HC RX 637 (ALT 250 FOR IP): Performed by: EMERGENCY MEDICINE

## 2025-04-18 PROCEDURE — 93971 EXTREMITY STUDY: CPT

## 2025-04-18 RX ADMIN — APIXABAN 10 MG: 5 TABLET, FILM COATED ORAL at 15:54

## 2025-04-18 NOTE — ED PROVIDER NOTES
EMERGENCY DEPARTMENT HISTORY AND PHYSICAL EXAM      Date: 4/18/2025  Patient Name: Carolynn Doe    History of Presenting Illness     Chief Complaint   Patient presents with    Leg Pain       History (Context): Carolynn Doe is a 39 y.o. female  presents to the ED today with chief complaint of right calf pain.  Denies any swelling or redness.  She was diagnosed with a DVT today 5 at outpatient vascular and sent over.  Patient has an acute nonocclusive DVT in the right posterior tibial vein and the right peroneal vein also nonocclusive.      PCP: Imelda Chanel, HEENA - NP    Current Facility-Administered Medications   Medication Dose Route Frequency Provider Last Rate Last Admin    apixaban (ELIQUIS) tablet 10 mg  10 mg Oral NOW Patricia Pryor PA         Current Outpatient Medications   Medication Sig Dispense Refill    apixaban starter pack (ELIQUIS DVT/PE STARTER PACK) 5 MG TBPK tablet Take 1 tablet by mouth See Admin Instructions 74 tablet 0    acetaminophen (TYLENOL) 325 MG tablet Take 2 tablets by mouth every 4 hours as needed      meclizine (ANTIVERT) 12.5 MG tablet Take 1 tablet by mouth every 8 hours as needed      naproxen sodium (ANAPROX) 220 MG tablet Take 2-3 tablets by mouth daily as needed      ondansetron (ZOFRAN) 4 MG tablet       oxyCODONE (ROXICODONE) 5 MG immediate release tablet Take 1 tablet by mouth every 6 hours as needed.      phenazopyridine (PYRIDIUM) 200 MG tablet Take 1 tablet by mouth 3 times daily (with meals)      naloxone 4 MG/0.1ML LIQD nasal spray       polyethylene glycol (GLYCOLAX) 17 GM/SCOOP powder  (Patient not taking: Reported on 4/17/2025)      famotidine (PEPCID) 20 MG tablet Take 1 tablet by mouth 2 times daily 120 tablet 1    medroxyPROGESTERone (PROVERA) 10 MG tablet Take 1 tablet by mouth daily      vitamin D (ERGOCALCIFEROL) 1.25 MG (65706 UT) CAPS capsule       ferrous sulfate (IRON 325) 325 (65 Fe) MG tablet Take 1 tablet by mouth

## 2025-04-18 NOTE — ED TRIAGE NOTES
Pt to fast track in wheelchair from [place] where she was seen outpt with DVT finding. Pt has some symptoms reporting \"super intense muscle cramp\" that feels to move up and down in R calf     Pt had same symptoms in R arm a week and a half ago [facility] in[facility] for endometriosis admission (drained ab cyst) but it resolved on it's own.

## 2025-04-19 PROCEDURE — 93971 EXTREMITY STUDY: CPT | Performed by: INTERNAL MEDICINE

## 2025-05-05 DIAGNOSIS — I82.491 ACUTE DEEP VEIN THROMBOSIS (DVT) OF OTHER SPECIFIED VEIN OF RIGHT LOWER EXTREMITY (HCC): ICD-10-CM

## 2025-05-05 DIAGNOSIS — D25.9 UTERINE LEIOMYOMA, UNSPECIFIED LOCATION: Primary | ICD-10-CM

## 2025-05-05 RX ORDER — OXYCODONE AND ACETAMINOPHEN 5; 325 MG/1; MG/1
TABLET ORAL
COMMUNITY
Start: 2025-04-23 | End: 2025-05-05 | Stop reason: SDUPTHER

## 2025-05-05 RX ORDER — OXYCODONE AND ACETAMINOPHEN 5; 325 MG/1; MG/1
1 TABLET ORAL EVERY 8 HOURS PRN
Qty: 9 TABLET | Refills: 0 | Status: SHIPPED | OUTPATIENT
Start: 2025-05-05 | End: 2025-05-08

## 2025-05-05 NOTE — TELEPHONE ENCOUNTER
Short course of Percocet sent to patient's pharmacy for acute DVT and abdominal pain. Pt is currently getting total abdominal hysterectomy on 5-8-2025 so provided 3 days worth of medication. PDMP reviewed.

## 2025-07-07 ENCOUNTER — OFFICE VISIT (OUTPATIENT)
Facility: CLINIC | Age: 40
End: 2025-07-07
Payer: OTHER GOVERNMENT

## 2025-07-07 ENCOUNTER — HOSPITAL ENCOUNTER (OUTPATIENT)
Facility: HOSPITAL | Age: 40
Setting detail: SPECIMEN
Discharge: HOME | End: 2025-07-10
Payer: OTHER GOVERNMENT

## 2025-07-07 VITALS
WEIGHT: 186 LBS | SYSTOLIC BLOOD PRESSURE: 109 MMHG | RESPIRATION RATE: 18 BRPM | OXYGEN SATURATION: 97 % | DIASTOLIC BLOOD PRESSURE: 65 MMHG | HEIGHT: 65 IN | BODY MASS INDEX: 30.99 KG/M2 | TEMPERATURE: 97.7 F | HEART RATE: 93 BPM

## 2025-07-07 DIAGNOSIS — D50.0 IRON DEFICIENCY ANEMIA DUE TO CHRONIC BLOOD LOSS: ICD-10-CM

## 2025-07-07 DIAGNOSIS — F51.04 PSYCHOPHYSIOLOGICAL INSOMNIA: ICD-10-CM

## 2025-07-07 DIAGNOSIS — Z82.49 FAMILY HISTORY OF DVT: ICD-10-CM

## 2025-07-07 DIAGNOSIS — F41.9 ANXIETY: ICD-10-CM

## 2025-07-07 DIAGNOSIS — F32.89 OTHER DEPRESSION: Primary | ICD-10-CM

## 2025-07-07 DIAGNOSIS — I82.509 CHRONIC DEEP VEIN THROMBOSIS (DVT) OF LOWER EXTREMITY, UNSPECIFIED LATERALITY, UNSPECIFIED VEIN (HCC): ICD-10-CM

## 2025-07-07 DIAGNOSIS — L70.0 ACNE VULGARIS: ICD-10-CM

## 2025-07-07 DIAGNOSIS — E89.0 POSTPROCEDURAL HYPOTHYROIDISM: ICD-10-CM

## 2025-07-07 DIAGNOSIS — Z90.710 H/O TOTAL HYSTERECTOMY: ICD-10-CM

## 2025-07-07 PROBLEM — N85.8 UTERINE MASS: Status: ACTIVE | Noted: 2025-05-08

## 2025-07-07 LAB
ERYTHROCYTE [DISTWIDTH] IN BLOOD BY AUTOMATED COUNT: 22.8 % (ref 11.6–14.5)
HCT VFR BLD AUTO: 36.5 % (ref 35–45)
HGB BLD-MCNC: 10.6 G/DL (ref 12–16)
IRON SATN MFR SERPL: 5 %
IRON SERPL-MCNC: 19 UG/DL (ref 50–175)
MCH RBC QN AUTO: 21.3 PG (ref 24–34)
MCHC RBC AUTO-ENTMCNC: 29 G/DL (ref 31–37)
MCV RBC AUTO: 73.3 FL (ref 78–100)
NRBC # BLD: 0 K/UL (ref 0–0.01)
NRBC BLD-RTO: 0 PER 100 WBC
PLATELET # BLD AUTO: 441 K/UL (ref 135–420)
PMV BLD AUTO: 10.4 FL (ref 9.2–11.8)
RBC # BLD AUTO: 4.98 M/UL (ref 4.2–5.3)
TIBC SERPL-MCNC: 379 UG/DL (ref 250–450)
TSH, 3RD GENERATION: 0.15 UIU/ML (ref 0.27–4.2)
UIBC SERPL-MCNC: 360 UG/DL (ref 112–347)
WBC # BLD AUTO: 6.2 K/UL (ref 4.6–13.2)

## 2025-07-07 PROCEDURE — 36415 COLL VENOUS BLD VENIPUNCTURE: CPT

## 2025-07-07 PROCEDURE — 84443 ASSAY THYROID STIM HORMONE: CPT

## 2025-07-07 PROCEDURE — 83550 IRON BINDING TEST: CPT

## 2025-07-07 PROCEDURE — 83540 ASSAY OF IRON: CPT

## 2025-07-07 PROCEDURE — 99214 OFFICE O/P EST MOD 30 MIN: CPT | Performed by: NURSE PRACTITIONER

## 2025-07-07 PROCEDURE — 85027 COMPLETE CBC AUTOMATED: CPT

## 2025-07-07 RX ORDER — GABAPENTIN 300 MG/1
300 CAPSULE ORAL 3 TIMES DAILY
COMMUNITY
Start: 2025-05-18 | End: 2025-07-07 | Stop reason: ALTCHOICE

## 2025-07-07 RX ORDER — PSEUDOEPHEDRINE HCL 30 MG
100 TABLET ORAL 2 TIMES DAILY
COMMUNITY
Start: 2025-05-18 | End: 2025-07-07 | Stop reason: ALTCHOICE

## 2025-07-07 RX ORDER — LIDOCAINE 50 MG/G
1 PATCH TOPICAL DAILY
COMMUNITY
Start: 2025-05-19 | End: 2025-07-07 | Stop reason: ALTCHOICE

## 2025-07-07 RX ORDER — ESCITALOPRAM OXALATE 5 MG/1
5 TABLET ORAL DAILY
Qty: 30 TABLET | Refills: 0 | Status: SHIPPED | OUTPATIENT
Start: 2025-07-07

## 2025-07-07 RX ORDER — TRETINOIN 0.5 MG/G
CREAM TOPICAL NIGHTLY
Qty: 45 G | Refills: 3 | Status: SHIPPED | OUTPATIENT
Start: 2025-07-07

## 2025-07-07 ASSESSMENT — PATIENT HEALTH QUESTIONNAIRE - PHQ9
SUM OF ALL RESPONSES TO PHQ QUESTIONS 1-9: 1
2. FEELING DOWN, DEPRESSED OR HOPELESS: SEVERAL DAYS
9. THOUGHTS THAT YOU WOULD BE BETTER OFF DEAD, OR OF HURTING YOURSELF: NOT AT ALL
SUM OF ALL RESPONSES TO PHQ QUESTIONS 1-9: 1
SUM OF ALL RESPONSES TO PHQ QUESTIONS 1-9: 1
1. LITTLE INTEREST OR PLEASURE IN DOING THINGS: NOT AT ALL
SUM OF ALL RESPONSES TO PHQ QUESTIONS 1-9: 1
4. FEELING TIRED OR HAVING LITTLE ENERGY: NOT AT ALL
3. TROUBLE FALLING OR STAYING ASLEEP: NOT AT ALL
8. MOVING OR SPEAKING SO SLOWLY THAT OTHER PEOPLE COULD HAVE NOTICED. OR THE OPPOSITE, BEING SO FIGETY OR RESTLESS THAT YOU HAVE BEEN MOVING AROUND A LOT MORE THAN USUAL: NOT AT ALL
10. IF YOU CHECKED OFF ANY PROBLEMS, HOW DIFFICULT HAVE THESE PROBLEMS MADE IT FOR YOU TO DO YOUR WORK, TAKE CARE OF THINGS AT HOME, OR GET ALONG WITH OTHER PEOPLE: NOT DIFFICULT AT ALL
6. FEELING BAD ABOUT YOURSELF - OR THAT YOU ARE A FAILURE OR HAVE LET YOURSELF OR YOUR FAMILY DOWN: NOT AT ALL
5. POOR APPETITE OR OVEREATING: NOT AT ALL
7. TROUBLE CONCENTRATING ON THINGS, SUCH AS READING THE NEWSPAPER OR WATCHING TELEVISION: NOT AT ALL

## 2025-07-07 NOTE — PROGRESS NOTES
Carolynn Doe (:  1985) is a 39 y.o. female, Established patient, here for evaluation of the following chief complaint(s):  Follow-up (Patient needs a referral for physical therapy, breaking out on face ), Anxiety, Sleep Problem, and Sweats (At night )      Assessment & Plan  1. Night sweats.  - Reports experiencing night sweats and anxiety, which are affecting her sleep.  - A complete blood count (CBC) and iron levels will be checked today. A thyroid-stimulating hormone (TSH) test will also be conducted to rule out any thyroid-related issues.  - Will follow up with her gynecologist for hormone evaluation.  - A referral for pelvic floor physical therapy will be made.    2. Anxiety.  - Reports feeling anxious about five days a week and has had two panic attacks since being home.  - Lexapro will be prescribed to manage her anxiety.  - Potential side effects, including gastrointestinal upset, were discussed. If GI side effects occur, she should take the medication at night.  - Follow-up visit scheduled in 1 month to assess the effectiveness of the medication.    3. Acne.  - Reports severe breakouts and cystic acne since her hysterectomy.  - Retin-A will be prescribed for her acne.  - Currently using retinol, salicylic acid, and glycolic acid, which have been somewhat effective.  - Will monitor the response to Retin-A and adjust treatment as necessary.    4. Constipation.  - Reports occasional constipation and uses Colace or Senokot as needed.  - Pelvic floor physical therapy is expected to help with bowel movements.  - If symptoms persist, a referral to a gastroenterologist will be considered.  - Advised to monitor bowel movements and report any significant changes.    5. Medication management.  - Will be put back on Eliquis 2.5 mg twice a day for six more months.  - A 90-day supply will be sent to her pharmacy.  - Current medications include Cytomel 5 mcg, Synthroid 137 mcg, and vitamin

## 2025-07-07 NOTE — PROGRESS NOTES
Carolynn Doe presents today for   Chief Complaint   Patient presents with    Follow-up     Patient needs a referral for physical therapy, breaking out on face     Anxiety    Sleep Problem    Sweats     At night        Is someone accompanying this pt? No     Is the patient using any DME equipment during OV? No         7/7/2025     9:12 AM   PHQ-9    Little interest or pleasure in doing things 0   Feeling down, depressed, or hopeless 1   Trouble falling or staying asleep, or sleeping too much 0   Feeling tired or having little energy 0   Poor appetite or overeating 0   Feeling bad about yourself - or that you are a failure or have let yourself or your family down 0   Trouble concentrating on things, such as reading the newspaper or watching television 0   Moving or speaking so slowly that other people could have noticed. Or the opposite - being so fidgety or restless that you have been moving around a lot more than usual 0   Thoughts that you would be better off dead, or of hurting yourself in some way 0   PHQ-2 Score 1   PHQ-9 Total Score 1   If you checked off any problems, how difficult have these problems made it for you to do your work, take care of things at home, or get along with other people? 0            Health Maintenance reviewed and discussed and ordered per Provider.    There are no preventive care reminders to display for this patient..        \"Have you been to the ER, urgent care clinic since your last visit?  Hospitalized since your last visit?\"    No     “Have you seen or consulted any other health care providers outside our system since your last visit?”    Surgeon

## 2025-07-10 PROBLEM — F51.04 PSYCHOPHYSIOLOGICAL INSOMNIA: Status: ACTIVE | Noted: 2025-07-10

## 2025-07-10 PROBLEM — L70.0 ACNE VULGARIS: Status: ACTIVE | Noted: 2025-07-10

## 2025-07-10 PROBLEM — I82.509 CHRONIC DEEP VEIN THROMBOSIS (DVT) OF LOWER EXTREMITY (HCC): Status: ACTIVE | Noted: 2025-07-10

## 2025-07-10 ASSESSMENT — ENCOUNTER SYMPTOMS
WHEEZING: 0
CHOKING: 0
COUGH: 0
STRIDOR: 0
CHEST TIGHTNESS: 0
ABDOMINAL PAIN: 0
TROUBLE SWALLOWING: 0
ANAL BLEEDING: 0
BLOOD IN STOOL: 0
VOICE CHANGE: 0
COLOR CHANGE: 1

## 2025-07-29 ENCOUNTER — OFFICE VISIT (OUTPATIENT)
Age: 40
End: 2025-07-29
Payer: OTHER GOVERNMENT

## 2025-07-29 VITALS
WEIGHT: 193 LBS | OXYGEN SATURATION: 99 % | HEIGHT: 65 IN | SYSTOLIC BLOOD PRESSURE: 102 MMHG | DIASTOLIC BLOOD PRESSURE: 70 MMHG | HEART RATE: 90 BPM | BODY MASS INDEX: 32.15 KG/M2

## 2025-07-29 DIAGNOSIS — I82.4Z3 ACUTE DEEP VEIN THROMBOSIS (DVT) OF DISTAL VEIN OF BOTH LOWER EXTREMITIES (HCC): Primary | ICD-10-CM

## 2025-07-29 PROCEDURE — 99204 OFFICE O/P NEW MOD 45 MIN: CPT | Performed by: SURGERY

## 2025-07-29 NOTE — PATIENT INSTRUCTIONS
Compression Stocking Resources      Online resources to purchase compression stockings:     www.[employer]  www.[employer]  www.[employer]  www.compressionstockings.[employer]    When you purchase compression stockings online, please ensure that you search for the correct height of stocking (\"knee high\" or \"thigh high\") as well as the correct compression strength (20-30mmHg).     Stockings can also be purchased at [employer], drug [employer], [employer] or similar stores but they tend to be very light compression and are not considered \"medical grade\".      Stockings may be purchased at medical supply stores but tend to be more expensive there.     It is important to buy the correct size and strength of compression stockings in order for them to be effective. Some brands will be sized by shoe size. However, especially if you have a lot of swelling, it may be more accurate to select a size based on individualized measurements (usually circumference at the ankle, calf and knee).       If you have any questions, please feel free to contact us for help.

## 2025-07-29 NOTE — PROGRESS NOTES
level: Not on file   Occupational History    Not on file   Tobacco Use    Smoking status: Never    Smokeless tobacco: Never   Vaping Use    Vaping status: Never Used   Substance and Sexual Activity    Alcohol use: Yes     Alcohol/week: 3.0 standard drinks of alcohol     Types: 3 Glasses of wine per week    Drug use: Never    Sexual activity: Yes     Partners: Male     Birth control/protection: None   Other Topics Concern    Not on file   Social History Narrative    Not on file     Social Drivers of Health     Financial Resource Strain: Low Risk  (1/6/2025)    Overall Financial Resource Strain (CARDIA)     Difficulty of Paying Living Expenses: Not hard at all   Food Insecurity: No Food Insecurity (4/10/2025)    Received from [facility]    Hunger Vital Sign     Worried About Running Out of Food in the Last Year: Never true     Ran Out of Food in the Last Year: Never true   Transportation Needs: No Transportation Needs (4/10/2025)    Received from [facility]    PRAPARE - Transportation     Lack of Transportation (Medical): No     Lack of Transportation (Non-Medical): No   Physical Activity: Not on file   Stress: Not on file   Social Connections: Not on file   Intimate Partner Violence: Not At Risk (4/10/2025)    Received from [facility]    Humiliation, Afraid, Rape, and Kick questionnaire     Fear of Current or Ex-Partner: No     Emotionally Abused: No     Physically Abused: No     Sexually Abused: No   Housing Stability: Low Risk  (4/10/2025)    Received from [facility]    Housing Stability Vital Sign     Unable to Pay for Housing in the Last Year: No     Number of Times Moved in the Last Year: 0     Homeless in the Last Year: No      Family History   Problem Relation Age of Onset    Cancer Sister 32        cervical cancer    Cancer Sister 28        reproductive cancer    Depression Sister     Mental Illness Sister     Cancer Sister 18        breast    Cancer Mother 31        breast and reproductive,

## 2025-07-31 ENCOUNTER — HOSPITAL ENCOUNTER (OUTPATIENT)
Facility: HOSPITAL | Age: 40
Setting detail: RECURRING SERIES
End: 2025-07-31
Payer: OTHER GOVERNMENT

## 2025-07-31 PROCEDURE — 97162 PT EVAL MOD COMPLEX 30 MIN: CPT
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
## 2025-07-31 NOTE — PROGRESS NOTES
PHYSICAL / OCCUPATIONAL THERAPY - DAILY TREATMENT NOTE    Patient Name: Carolynn Doe    Date: 2025    : 1985  Insurance: Payor:  [employer] / Plan: [employer] / Product Type: *No Product type* /      Patient  verified Yes     Visit #   Current / Total 1 16   Time   In / Out 1:55 2:30   Pain   In / Out 0 0   Subjective Functional Status/Changes: See POC     TREATMENT AREA =  H/O total hysterectomy    OBJECTIVE    35 min [x]Eval      untimed            []Re-Eval         Therapeutic Procedures:        0  [facility] BC Totals Reminder: bill using total billable min of TIMED therapeutic procedures (example: do not include dry needle or estim unattended, both untimed codes, in totals to left)  8-22 min = 1 unit; 23-37 min = 2 units; 38-52 min = 3 units; 53-67 min = 4 units; 68-82 min = 5 units   Total Total     Charge Capture    [x]  Patient Education billed concurrently with other procedures   [x] Review HEP    [] Progressed/Changed HEP, detail:    [] Other detail:       Objective Information/Functional Measures/Assessment    See POC    Patient will continue to benefit from skilled PT / OT services to modify and progress therapeutic interventions, analyze and address functional mobility deficits, analyze and address ROM deficits, analyze and address strength deficits, analyze and address soft tissue restrictions, analyze and cue for proper movement patterns, and analyze and modify for postural abnormalities to address functional deficits and attain remaining goals.    Progress toward goals / Updated goals:  []  See Progress Note/Recertification    See POC    Next PN/ RC due 25  Auth due 15 v 25    PLAN  - Continue Plan of Care    Avani Joseph, PT    2025    10:47 AM  If an interpreting service was utilized for treatment of this patient, the contents of this document represent the material reviewed with the patient via the .     Future Appointments

## 2025-08-07 ENCOUNTER — TELEMEDICINE (OUTPATIENT)
Facility: CLINIC | Age: 40
End: 2025-08-07

## 2025-08-07 DIAGNOSIS — Z12.31 BREAST CANCER SCREENING BY MAMMOGRAM: Primary | ICD-10-CM

## 2025-08-07 DIAGNOSIS — Z90.710 H/O TOTAL HYSTERECTOMY: ICD-10-CM

## 2025-08-07 DIAGNOSIS — F41.9 ANXIETY: ICD-10-CM

## 2025-08-07 DIAGNOSIS — E89.0 POSTPROCEDURAL HYPOTHYROIDISM: ICD-10-CM

## 2025-08-07 DIAGNOSIS — I82.5Y9 CHRONIC DEEP VEIN THROMBOSIS (DVT) OF PROXIMAL VEIN OF LOWER EXTREMITY, UNSPECIFIED LATERALITY (HCC): ICD-10-CM

## 2025-08-07 DIAGNOSIS — M62.89 PELVIC FLOOR DYSFUNCTION IN FEMALE: ICD-10-CM

## 2025-08-07 PROBLEM — I82.409 DEEP VENOUS THROMBOSIS (HCC): Status: ACTIVE | Noted: 2025-08-07

## 2025-08-07 ASSESSMENT — PATIENT HEALTH QUESTIONNAIRE - PHQ9
1. LITTLE INTEREST OR PLEASURE IN DOING THINGS: NOT AT ALL
SUM OF ALL RESPONSES TO PHQ QUESTIONS 1-9: 0
SUM OF ALL RESPONSES TO PHQ QUESTIONS 1-9: 0
2. FEELING DOWN, DEPRESSED OR HOPELESS: NOT AT ALL
SUM OF ALL RESPONSES TO PHQ QUESTIONS 1-9: 0
SUM OF ALL RESPONSES TO PHQ QUESTIONS 1-9: 0

## 2025-08-12 ENCOUNTER — HOSPITAL ENCOUNTER (OUTPATIENT)
Facility: HOSPITAL | Age: 40
Setting detail: RECURRING SERIES
Discharge: HOME | End: 2025-08-15
Payer: OTHER GOVERNMENT

## 2025-08-12 PROCEDURE — 97112 NEUROMUSCULAR REEDUCATION: CPT

## 2025-08-12 PROCEDURE — 97110 THERAPEUTIC EXERCISES: CPT

## 2025-08-14 ENCOUNTER — HOSPITAL ENCOUNTER (OUTPATIENT)
Facility: HOSPITAL | Age: 40
Setting detail: RECURRING SERIES
Discharge: HOME | End: 2025-08-17
Payer: OTHER GOVERNMENT

## 2025-08-14 PROCEDURE — 97140 MANUAL THERAPY 1/> REGIONS: CPT

## 2025-08-14 PROCEDURE — 97112 NEUROMUSCULAR REEDUCATION: CPT

## 2025-08-19 ENCOUNTER — HOSPITAL ENCOUNTER (OUTPATIENT)
Facility: HOSPITAL | Age: 40
Setting detail: RECURRING SERIES
Discharge: HOME | End: 2025-08-22
Payer: OTHER GOVERNMENT

## 2025-08-19 PROCEDURE — 97112 NEUROMUSCULAR REEDUCATION: CPT

## 2025-08-19 PROCEDURE — 97110 THERAPEUTIC EXERCISES: CPT

## 2025-08-26 ENCOUNTER — HOSPITAL ENCOUNTER (OUTPATIENT)
Facility: HOSPITAL | Age: 40
Setting detail: RECURRING SERIES
Discharge: HOME | End: 2025-08-29
Payer: OTHER GOVERNMENT

## 2025-08-26 PROCEDURE — 97112 NEUROMUSCULAR REEDUCATION: CPT

## 2025-08-28 ENCOUNTER — HOSPITAL ENCOUNTER (OUTPATIENT)
Facility: HOSPITAL | Age: 40
Setting detail: RECURRING SERIES
Discharge: HOME | End: 2025-08-31
Payer: OTHER GOVERNMENT

## 2025-08-28 PROCEDURE — 97535 SELF CARE MNGMENT TRAINING: CPT

## 2025-08-28 PROCEDURE — 97112 NEUROMUSCULAR REEDUCATION: CPT
